# Patient Record
Sex: FEMALE | Race: WHITE | Employment: OTHER | URBAN - METROPOLITAN AREA
[De-identification: names, ages, dates, MRNs, and addresses within clinical notes are randomized per-mention and may not be internally consistent; named-entity substitution may affect disease eponyms.]

---

## 2021-09-08 ENCOUNTER — APPOINTMENT (OUTPATIENT)
Dept: CT IMAGING | Age: 61
DRG: 185 | End: 2021-09-08
Payer: COMMERCIAL

## 2021-09-08 ENCOUNTER — HOSPITAL ENCOUNTER (INPATIENT)
Age: 61
LOS: 1 days | Discharge: HOME | DRG: 185 | End: 2021-09-09
Attending: EMERGENCY MEDICINE | Admitting: SURGERY
Payer: COMMERCIAL

## 2021-09-08 ENCOUNTER — APPOINTMENT (OUTPATIENT)
Dept: GENERAL RADIOLOGY | Age: 61
DRG: 185 | End: 2021-09-08
Payer: COMMERCIAL
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
## 2021-09-08 DIAGNOSIS — S09.90XA CLOSED HEAD INJURY, INITIAL ENCOUNTER: ICD-10-CM

## 2021-09-08 DIAGNOSIS — S22.41XA CLOSED FRACTURE OF MULTIPLE RIBS OF RIGHT SIDE, INITIAL ENCOUNTER: ICD-10-CM

## 2021-09-08 PROBLEM — S22.42XA TRAUMATIC CLOSED DISPLACED FRACTURE OF MULTIPLE RIBS OF LEFT SIDE: Status: ACTIVE | Noted: 2021-09-08

## 2021-09-08 LAB
ABO/RH: NORMAL
ALLEN TEST: ABNORMAL
ANION GAP SERPL CALCULATED.3IONS-SCNC: 11 MMOL/L (ref 9–17)
ANTIBODY SCREEN: NEGATIVE
ARM BAND NUMBER: NORMAL
BLOOD BANK SPECIMEN: ABNORMAL
BUN BLDV-MCNC: 17 MG/DL (ref 8–23)
CARBOXYHEMOGLOBIN: 0.8 % (ref 0–5)
CHLORIDE BLD-SCNC: 102 MMOL/L (ref 98–107)
CO2: 24 MMOL/L (ref 20–31)
CREAT SERPL-MCNC: 0.7 MG/DL (ref 0.5–0.9)
ETHANOL PERCENT: <0.01 %
ETHANOL: <10 MG/DL
EXPIRATION DATE: NORMAL
FIO2: ABNORMAL
GFR AFRICAN AMERICAN: ABNORMAL ML/MIN
GFR NON-AFRICAN AMERICAN: ABNORMAL ML/MIN
GFR SERPL CREATININE-BSD FRML MDRD: ABNORMAL ML/MIN/{1.73_M2}
GFR SERPL CREATININE-BSD FRML MDRD: ABNORMAL ML/MIN/{1.73_M2}
GLUCOSE BLD-MCNC: 113 MG/DL (ref 70–99)
HCG QUALITATIVE: ABNORMAL
HCO3 VENOUS: 25.4 MMOL/L (ref 24–30)
HCT VFR BLD CALC: 43.1 % (ref 36.3–47.1)
HEMOGLOBIN: 14.2 G/DL (ref 11.9–15.1)
INR BLD: 0.9
MCH RBC QN AUTO: 27.5 PG (ref 25.2–33.5)
MCHC RBC AUTO-ENTMCNC: 32.9 G/DL (ref 28.4–34.8)
MCV RBC AUTO: 83.5 FL (ref 82.6–102.9)
METHEMOGLOBIN: ABNORMAL % (ref 0–1.5)
MODE: ABNORMAL
MYOGLOBIN: 526 NG/ML (ref 25–58)
NEGATIVE BASE EXCESS, VEN: ABNORMAL MMOL/L (ref 0–2)
NOTIFICATION TIME: ABNORMAL
NOTIFICATION: ABNORMAL
NRBC AUTOMATED: 0 PER 100 WBC
O2 DEVICE/FLOW/%: ABNORMAL
O2 SAT, VEN: 78.6 % (ref 60–85)
OXYHEMOGLOBIN: ABNORMAL % (ref 95–98)
PARTIAL THROMBOPLASTIN TIME: 22.8 SEC (ref 20.5–30.5)
PATIENT TEMP: 37
PCO2, VEN, TEMP ADJ: ABNORMAL MMHG (ref 39–55)
PCO2, VEN: 42.2 (ref 39–55)
PDW BLD-RTO: 14 % (ref 11.8–14.4)
PEEP/CPAP: ABNORMAL
PH VENOUS: 7.4 (ref 7.32–7.42)
PH, VEN, TEMP ADJ: ABNORMAL (ref 7.32–7.42)
PLATELET # BLD: 273 K/UL (ref 138–453)
PMV BLD AUTO: 8.8 FL (ref 8.1–13.5)
PO2, VEN, TEMP ADJ: ABNORMAL MMHG (ref 30–50)
PO2, VEN: 43 (ref 30–50)
POSITIVE BASE EXCESS, VEN: 0.9 MMOL/L (ref 0–2)
POTASSIUM SERPL-SCNC: 4.3 MMOL/L (ref 3.7–5.3)
PROTHROMBIN TIME: 10.2 SEC (ref 9.1–12.3)
PSV: ABNORMAL
PT. POSITION: ABNORMAL
RBC # BLD: 5.16 M/UL (ref 3.95–5.11)
RESPIRATORY RATE: ABNORMAL
SAMPLE SITE: ABNORMAL
SET RATE: ABNORMAL
SODIUM BLD-SCNC: 137 MMOL/L (ref 135–144)
TEXT FOR RESPIRATORY: ABNORMAL
TOTAL CK: 201 U/L (ref 26–192)
TOTAL HB: ABNORMAL G/DL (ref 12–16)
TOTAL RATE: ABNORMAL
TROPONIN INTERP: ABNORMAL
TROPONIN T: ABNORMAL NG/ML
TROPONIN, HIGH SENSITIVITY: <6 NG/L (ref 0–14)
VT: ABNORMAL
WBC # BLD: 10.1 K/UL (ref 3.5–11.3)

## 2021-09-08 PROCEDURE — 82947 ASSAY GLUCOSE BLOOD QUANT: CPT

## 2021-09-08 PROCEDURE — 6370000000 HC RX 637 (ALT 250 FOR IP): Performed by: STUDENT IN AN ORGANIZED HEALTH CARE EDUCATION/TRAINING PROGRAM

## 2021-09-08 PROCEDURE — 70450 CT HEAD/BRAIN W/O DYE: CPT

## 2021-09-08 PROCEDURE — 6810039001 HC L1 TRAUMA PRIORITY

## 2021-09-08 PROCEDURE — 83874 ASSAY OF MYOGLOBIN: CPT

## 2021-09-08 PROCEDURE — 2580000003 HC RX 258: Performed by: NURSE PRACTITIONER

## 2021-09-08 PROCEDURE — 73630 X-RAY EXAM OF FOOT: CPT

## 2021-09-08 PROCEDURE — 6370000000 HC RX 637 (ALT 250 FOR IP)

## 2021-09-08 PROCEDURE — 99283 EMERGENCY DEPT VISIT LOW MDM: CPT

## 2021-09-08 PROCEDURE — 86901 BLOOD TYPING SEROLOGIC RH(D): CPT

## 2021-09-08 PROCEDURE — 86850 RBC ANTIBODY SCREEN: CPT

## 2021-09-08 PROCEDURE — 73070 X-RAY EXAM OF ELBOW: CPT

## 2021-09-08 PROCEDURE — G0480 DRUG TEST DEF 1-7 CLASSES: HCPCS

## 2021-09-08 PROCEDURE — 71260 CT THORAX DX C+: CPT

## 2021-09-08 PROCEDURE — 1200000000 HC SEMI PRIVATE

## 2021-09-08 PROCEDURE — 73130 X-RAY EXAM OF HAND: CPT

## 2021-09-08 PROCEDURE — 72125 CT NECK SPINE W/O DYE: CPT

## 2021-09-08 PROCEDURE — 6360000002 HC RX W HCPCS: Performed by: NURSE PRACTITIONER

## 2021-09-08 PROCEDURE — 6370000000 HC RX 637 (ALT 250 FOR IP): Performed by: NURSE PRACTITIONER

## 2021-09-08 PROCEDURE — 3209999900 CT LUMBAR SPINE TRAUMA RECONSTRUCTION

## 2021-09-08 PROCEDURE — 80051 ELECTROLYTE PANEL: CPT

## 2021-09-08 PROCEDURE — 82550 ASSAY OF CK (CPK): CPT

## 2021-09-08 PROCEDURE — 87635 SARS-COV-2 COVID-19 AMP PRB: CPT

## 2021-09-08 PROCEDURE — 82565 ASSAY OF CREATININE: CPT

## 2021-09-08 PROCEDURE — 6360000004 HC RX CONTRAST MEDICATION: Performed by: EMERGENCY MEDICINE

## 2021-09-08 PROCEDURE — 73560 X-RAY EXAM OF KNEE 1 OR 2: CPT

## 2021-09-08 PROCEDURE — 82805 BLOOD GASES W/O2 SATURATION: CPT

## 2021-09-08 PROCEDURE — 84703 CHORIONIC GONADOTROPIN ASSAY: CPT

## 2021-09-08 PROCEDURE — 84484 ASSAY OF TROPONIN QUANT: CPT

## 2021-09-08 PROCEDURE — 3209999900 CT THORACIC SPINE TRAUMA RECONSTRUCTION

## 2021-09-08 PROCEDURE — 73590 X-RAY EXAM OF LOWER LEG: CPT

## 2021-09-08 PROCEDURE — 84520 ASSAY OF UREA NITROGEN: CPT

## 2021-09-08 PROCEDURE — 73610 X-RAY EXAM OF ANKLE: CPT

## 2021-09-08 PROCEDURE — 85027 COMPLETE CBC AUTOMATED: CPT

## 2021-09-08 PROCEDURE — 80307 DRUG TEST PRSMV CHEM ANLYZR: CPT

## 2021-09-08 PROCEDURE — 86900 BLOOD TYPING SEROLOGIC ABO: CPT

## 2021-09-08 PROCEDURE — 85730 THROMBOPLASTIN TIME PARTIAL: CPT

## 2021-09-08 PROCEDURE — 85610 PROTHROMBIN TIME: CPT

## 2021-09-08 RX ORDER — GABAPENTIN 300 MG/1
300 CAPSULE ORAL 3 TIMES DAILY
Status: DISCONTINUED | OUTPATIENT
Start: 2021-09-08 | End: 2021-09-09 | Stop reason: HOSPADM

## 2021-09-08 RX ORDER — ROSUVASTATIN CALCIUM 5 MG/1
5 TABLET, COATED ORAL DAILY
Status: DISCONTINUED | OUTPATIENT
Start: 2021-09-09 | End: 2021-09-09 | Stop reason: HOSPADM

## 2021-09-08 RX ORDER — BUSPIRONE HYDROCHLORIDE 5 MG/1
5 TABLET ORAL 3 TIMES DAILY
COMMUNITY
End: 2022-02-21 | Stop reason: SDUPTHER

## 2021-09-08 RX ORDER — PREDNISOLONE ACETATE 10 MG/ML
1 SUSPENSION/ DROPS OPHTHALMIC DAILY
Status: DISCONTINUED | OUTPATIENT
Start: 2021-09-09 | End: 2021-09-09 | Stop reason: HOSPADM

## 2021-09-08 RX ORDER — BRIMONIDINE TARTRATE 2 MG/ML
1 SOLUTION/ DROPS OPHTHALMIC 2 TIMES DAILY
Status: DISCONTINUED | OUTPATIENT
Start: 2021-09-08 | End: 2021-09-09 | Stop reason: HOSPADM

## 2021-09-08 RX ORDER — ROSUVASTATIN CALCIUM 5 MG/1
5 TABLET, COATED ORAL DAILY
Status: ON HOLD | COMMUNITY
End: 2022-10-20

## 2021-09-08 RX ORDER — ONDANSETRON 4 MG/1
4 TABLET, FILM COATED ORAL EVERY 8 HOURS PRN
Status: DISCONTINUED | OUTPATIENT
Start: 2021-09-08 | End: 2021-09-09 | Stop reason: HOSPADM

## 2021-09-08 RX ORDER — OXYCODONE HYDROCHLORIDE 5 MG/1
5 TABLET ORAL EVERY 6 HOURS PRN
Status: DISCONTINUED | OUTPATIENT
Start: 2021-09-08 | End: 2021-09-09 | Stop reason: HOSPADM

## 2021-09-08 RX ORDER — ACETAMINOPHEN 500 MG
1000 TABLET ORAL EVERY 8 HOURS SCHEDULED
Status: DISCONTINUED | OUTPATIENT
Start: 2021-09-08 | End: 2021-09-09 | Stop reason: HOSPADM

## 2021-09-08 RX ORDER — SENNA AND DOCUSATE SODIUM 50; 8.6 MG/1; MG/1
1 TABLET, FILM COATED ORAL 2 TIMES DAILY
Status: DISCONTINUED | OUTPATIENT
Start: 2021-09-08 | End: 2021-09-09 | Stop reason: HOSPADM

## 2021-09-08 RX ORDER — POLYETHYLENE GLYCOL 3350 17 G/17G
17 POWDER, FOR SOLUTION ORAL DAILY
Status: DISCONTINUED | OUTPATIENT
Start: 2021-09-08 | End: 2021-09-09 | Stop reason: HOSPADM

## 2021-09-08 RX ORDER — BUSPIRONE HYDROCHLORIDE 5 MG/1
5 TABLET ORAL 3 TIMES DAILY
Status: DISCONTINUED | OUTPATIENT
Start: 2021-09-08 | End: 2021-09-09 | Stop reason: HOSPADM

## 2021-09-08 RX ORDER — SODIUM CHLORIDE 0.9 % (FLUSH) 0.9 %
10 SYRINGE (ML) INJECTION EVERY 12 HOURS SCHEDULED
Status: DISCONTINUED | OUTPATIENT
Start: 2021-09-08 | End: 2021-09-09 | Stop reason: HOSPADM

## 2021-09-08 RX ORDER — GLIMEPIRIDE 2 MG/1
1 TABLET ORAL DAILY
Status: DISCONTINUED | OUTPATIENT
Start: 2021-09-09 | End: 2021-09-09 | Stop reason: HOSPADM

## 2021-09-08 RX ORDER — SOLIFENACIN SUCCINATE 5 MG/1
5 TABLET, FILM COATED ORAL DAILY
COMMUNITY
End: 2021-09-14

## 2021-09-08 RX ORDER — LATANOPROST 50 UG/ML
1 SOLUTION/ DROPS OPHTHALMIC NIGHTLY
Status: DISCONTINUED | OUTPATIENT
Start: 2021-09-08 | End: 2021-09-09 | Stop reason: HOSPADM

## 2021-09-08 RX ORDER — METHOCARBAMOL 750 MG/1
750 TABLET, FILM COATED ORAL 3 TIMES DAILY
Status: DISCONTINUED | OUTPATIENT
Start: 2021-09-08 | End: 2021-09-09 | Stop reason: HOSPADM

## 2021-09-08 RX ORDER — VIBEGRON 75 MG/1
75 TABLET, FILM COATED ORAL DAILY
COMMUNITY
End: 2022-02-24 | Stop reason: SDUPTHER

## 2021-09-08 RX ORDER — SODIUM CHLORIDE 0.9 % (FLUSH) 0.9 %
10 SYRINGE (ML) INJECTION PRN
Status: DISCONTINUED | OUTPATIENT
Start: 2021-09-08 | End: 2021-09-09 | Stop reason: HOSPADM

## 2021-09-08 RX ORDER — BACITRACIN, NEOMYCIN, POLYMYXIN B 400; 3.5; 5 [USP'U]/G; MG/G; [USP'U]/G
OINTMENT TOPICAL 2 TIMES DAILY
Status: DISCONTINUED | OUTPATIENT
Start: 2021-09-08 | End: 2021-09-09 | Stop reason: HOSPADM

## 2021-09-08 RX ORDER — ESOMEPRAZOLE MAGNESIUM 40 MG/1
40 FOR SUSPENSION ORAL 2 TIMES DAILY
COMMUNITY

## 2021-09-08 RX ADMIN — LATANOPROST 1 DROP: 50 SOLUTION OPHTHALMIC at 21:11

## 2021-09-08 RX ADMIN — BUSPIRONE HYDROCHLORIDE 5 MG: 5 TABLET ORAL at 22:27

## 2021-09-08 RX ADMIN — METHOCARBAMOL TABLETS 750 MG: 750 TABLET, COATED ORAL at 21:00

## 2021-09-08 RX ADMIN — DOCUSATE SODIUM 50MG AND SENNOSIDES 8.6MG 1 TABLET: 8.6; 5 TABLET, FILM COATED ORAL at 21:00

## 2021-09-08 RX ADMIN — ENOXAPARIN SODIUM 30 MG: 30 INJECTION SUBCUTANEOUS at 21:06

## 2021-09-08 RX ADMIN — GABAPENTIN 300 MG: 300 CAPSULE ORAL at 21:00

## 2021-09-08 RX ADMIN — BRIMONIDINE TARTRATE 1 DROP: 2 SOLUTION OPHTHALMIC at 21:12

## 2021-09-08 RX ADMIN — ONDANSETRON HYDROCHLORIDE 4 MG: 4 TABLET, FILM COATED ORAL at 20:48

## 2021-09-08 RX ADMIN — POLYMYXIN B SULFATE, BACITRACIN ZINC, NEOMYCIN SULFATE: 5000; 3.5; 4 OINTMENT TOPICAL at 21:14

## 2021-09-08 RX ADMIN — IOPAMIDOL 130 ML: 755 INJECTION, SOLUTION INTRAVENOUS at 15:47

## 2021-09-08 RX ADMIN — OXYCODONE HYDROCHLORIDE 5 MG: 5 TABLET ORAL at 18:51

## 2021-09-08 RX ADMIN — SODIUM CHLORIDE, PRESERVATIVE FREE 10 ML: 5 INJECTION INTRAVENOUS at 21:06

## 2021-09-08 ASSESSMENT — ENCOUNTER SYMPTOMS
ABDOMINAL DISTENTION: 0
NAUSEA: 0
PHOTOPHOBIA: 0
BACK PAIN: 1
SHORTNESS OF BREATH: 0
ABDOMINAL PAIN: 0
GASTROINTESTINAL NEGATIVE: 1
ALLERGIC/IMMUNOLOGIC NEGATIVE: 1
VOMITING: 0
EYES NEGATIVE: 1
RESPIRATORY NEGATIVE: 1

## 2021-09-08 ASSESSMENT — PAIN DESCRIPTION - PROGRESSION
CLINICAL_PROGRESSION: NOT CHANGED

## 2021-09-08 ASSESSMENT — PAIN DESCRIPTION - LOCATION: LOCATION: ANKLE;KNEE

## 2021-09-08 ASSESSMENT — PAIN DESCRIPTION - PAIN TYPE: TYPE: ACUTE PAIN;CHRONIC PAIN

## 2021-09-08 ASSESSMENT — PAIN - FUNCTIONAL ASSESSMENT: PAIN_FUNCTIONAL_ASSESSMENT: ACTIVITIES ARE NOT PREVENTED

## 2021-09-08 ASSESSMENT — PAIN SCALES - GENERAL
PAINLEVEL_OUTOF10: 5
PAINLEVEL_OUTOF10: 6
PAINLEVEL_OUTOF10: 6

## 2021-09-08 ASSESSMENT — PAIN DESCRIPTION - ORIENTATION: ORIENTATION: RIGHT

## 2021-09-08 ASSESSMENT — PAIN DESCRIPTION - FREQUENCY: FREQUENCY: CONTINUOUS

## 2021-09-08 ASSESSMENT — PAIN DESCRIPTION - DESCRIPTORS: DESCRIPTORS: ACHING;DISCOMFORT

## 2021-09-08 ASSESSMENT — PAIN DESCRIPTION - ONSET: ONSET: ON-GOING
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
## 2021-09-08 NOTE — PROGRESS NOTES
C- Spine Evaluation for Spine Clearance:    Pt is a 63 y.o. female who was admitted on 9/8/21 s/p [facility]. Pt w/ complaints of elbow, wrist, knee pain. C-Spine precautions of C-collar with spinal neutrality maintained since arrival with current exam directed at further evaluation of spine for clearance purposes. Pt chart and current images reviewed. CT C-Spine negative for acute fracture, subluxation, or traumatic injury. Patient does not have a distracting injury, is not acutely intoxicated and is alert, oriented and fully able to participate in exam.      Pt denies c-spine pain while resting in c-collar. C-collar removed w/ c-spine neutrality maintained. Pt denies midline pain with palpation of spinous processes and axial loading. Pt demonstrated full flexion, extension, and SB ROM without complaints of pain. C-spine is considered cleared w/out need for further imaging, evaluation, or continuation of c-collar.      Electronically signed by Raya Wheatley MD on 9/8/2021 at 4:41 PM

## 2021-09-09 ENCOUNTER — APPOINTMENT (OUTPATIENT)
Dept: GENERAL RADIOLOGY | Age: 61
DRG: 185 | End: 2021-09-09
Payer: COMMERCIAL

## 2021-09-09 VITALS
WEIGHT: 155 LBS | OXYGEN SATURATION: 96 % | TEMPERATURE: 98.1 F | BODY MASS INDEX: 27.46 KG/M2 | HEART RATE: 81 BPM | RESPIRATION RATE: 18 BRPM | SYSTOLIC BLOOD PRESSURE: 100 MMHG | HEIGHT: 63 IN | DIASTOLIC BLOOD PRESSURE: 64 MMHG

## 2021-09-09 LAB
ANION GAP SERPL CALCULATED.3IONS-SCNC: 10 MMOL/L (ref 9–17)
BUN BLDV-MCNC: 15 MG/DL (ref 8–23)
BUN/CREAT BLD: ABNORMAL (ref 9–20)
CALCIUM SERPL-MCNC: 8.6 MG/DL (ref 8.6–10.4)
CHLORIDE BLD-SCNC: 103 MMOL/L (ref 98–107)
CO2: 21 MMOL/L (ref 20–31)
CREAT SERPL-MCNC: 0.74 MG/DL (ref 0.5–0.9)
GFR AFRICAN AMERICAN: >60 ML/MIN
GFR NON-AFRICAN AMERICAN: >60 ML/MIN
GFR SERPL CREATININE-BSD FRML MDRD: ABNORMAL ML/MIN/{1.73_M2}
GFR SERPL CREATININE-BSD FRML MDRD: ABNORMAL ML/MIN/{1.73_M2}
GLUCOSE BLD-MCNC: 117 MG/DL (ref 70–99)
HCT VFR BLD CALC: 36.4 % (ref 36.3–47.1)
HEMOGLOBIN: 11.8 G/DL (ref 11.9–15.1)
MAGNESIUM: 2.1 MG/DL (ref 1.6–2.6)
MCH RBC QN AUTO: 27.4 PG (ref 25.2–33.5)
MCHC RBC AUTO-ENTMCNC: 32.4 G/DL (ref 28.4–34.8)
MCV RBC AUTO: 84.7 FL (ref 82.6–102.9)
NRBC AUTOMATED: 0 PER 100 WBC
PDW BLD-RTO: 14 % (ref 11.8–14.4)
PHOSPHORUS: 3.8 MG/DL (ref 2.6–4.5)
PLATELET # BLD: 226 K/UL (ref 138–453)
PMV BLD AUTO: 9.2 FL (ref 8.1–13.5)
POTASSIUM SERPL-SCNC: 3.9 MMOL/L (ref 3.7–5.3)
RBC # BLD: 4.3 M/UL (ref 3.95–5.11)
SODIUM BLD-SCNC: 134 MMOL/L (ref 135–144)
WBC # BLD: 6.4 K/UL (ref 3.5–11.3)

## 2021-09-09 PROCEDURE — 97166 OT EVAL MOD COMPLEX 45 MIN: CPT

## 2021-09-09 PROCEDURE — 85027 COMPLETE CBC AUTOMATED: CPT

## 2021-09-09 PROCEDURE — 83735 ASSAY OF MAGNESIUM: CPT

## 2021-09-09 PROCEDURE — 84100 ASSAY OF PHOSPHORUS: CPT

## 2021-09-09 PROCEDURE — 71046 X-RAY EXAM CHEST 2 VIEWS: CPT

## 2021-09-09 PROCEDURE — 6360000002 HC RX W HCPCS: Performed by: NURSE PRACTITIONER

## 2021-09-09 PROCEDURE — 80048 BASIC METABOLIC PNL TOTAL CA: CPT

## 2021-09-09 PROCEDURE — 97162 PT EVAL MOD COMPLEX 30 MIN: CPT

## 2021-09-09 PROCEDURE — 6370000000 HC RX 637 (ALT 250 FOR IP): Performed by: NURSE PRACTITIONER

## 2021-09-09 PROCEDURE — 97535 SELF CARE MNGMENT TRAINING: CPT

## 2021-09-09 PROCEDURE — 2580000003 HC RX 258: Performed by: NURSE PRACTITIONER

## 2021-09-09 PROCEDURE — 6370000000 HC RX 637 (ALT 250 FOR IP)

## 2021-09-09 PROCEDURE — 36415 COLL VENOUS BLD VENIPUNCTURE: CPT

## 2021-09-09 PROCEDURE — 6370000000 HC RX 637 (ALT 250 FOR IP): Performed by: STUDENT IN AN ORGANIZED HEALTH CARE EDUCATION/TRAINING PROGRAM

## 2021-09-09 PROCEDURE — 97116 GAIT TRAINING THERAPY: CPT

## 2021-09-09 RX ORDER — POLYETHYLENE GLYCOL 3350 17 G/17G
17 POWDER, FOR SOLUTION ORAL DAILY
Qty: 7 EACH | Refills: 0 | Status: SHIPPED | OUTPATIENT
Start: 2021-09-10 | End: 2021-09-17

## 2021-09-09 RX ORDER — ONDANSETRON 4 MG/1
4 TABLET, FILM COATED ORAL 3 TIMES DAILY PRN
Qty: 15 TABLET | Refills: 0 | Status: SHIPPED | OUTPATIENT
Start: 2021-09-09 | End: 2021-09-14

## 2021-09-09 RX ORDER — BRIMONIDINE TARTRATE, TIMOLOL MALEATE 2; 5 MG/ML; MG/ML
1 SOLUTION/ DROPS OPHTHALMIC EVERY 12 HOURS
COMMUNITY

## 2021-09-09 RX ORDER — GABAPENTIN 300 MG/1
300 CAPSULE ORAL 3 TIMES DAILY
Qty: 21 CAPSULE | Refills: 0 | Status: SHIPPED | OUTPATIENT
Start: 2021-09-09 | End: 2022-06-03

## 2021-09-09 RX ORDER — OXYCODONE HYDROCHLORIDE 5 MG/1
5 TABLET ORAL EVERY 8 HOURS PRN
Qty: 9 TABLET | Refills: 0 | Status: SHIPPED | OUTPATIENT
Start: 2021-09-09 | End: 2021-09-12

## 2021-09-09 RX ORDER — LATANOPROST 50 UG/ML
1 SOLUTION/ DROPS OPHTHALMIC NIGHTLY
COMMUNITY

## 2021-09-09 RX ORDER — TIMOLOL MALEATE 2.5 MG/ML
1 SOLUTION OPHTHALMIC DAILY
COMMUNITY

## 2021-09-09 RX ORDER — METHOCARBAMOL 750 MG/1
750 TABLET, FILM COATED ORAL 3 TIMES DAILY
Qty: 21 TABLET | Refills: 0 | Status: SHIPPED | OUTPATIENT
Start: 2021-09-09 | End: 2021-09-16

## 2021-09-09 RX ORDER — PREDNISOLONE SODIUM PHOSPHATE 10 MG/ML
1 SOLUTION/ DROPS OPHTHALMIC DAILY
COMMUNITY

## 2021-09-09 RX ADMIN — DOCUSATE SODIUM 50MG AND SENNOSIDES 8.6MG 1 TABLET: 8.6; 5 TABLET, FILM COATED ORAL at 08:30

## 2021-09-09 RX ADMIN — ONDANSETRON HYDROCHLORIDE 4 MG: 4 TABLET, FILM COATED ORAL at 14:18

## 2021-09-09 RX ADMIN — METHOCARBAMOL TABLETS 750 MG: 750 TABLET, COATED ORAL at 08:30

## 2021-09-09 RX ADMIN — POLYMYXIN B SULFATE, BACITRACIN ZINC, NEOMYCIN SULFATE: 5000; 3.5; 4 OINTMENT TOPICAL at 08:35

## 2021-09-09 RX ADMIN — ENOXAPARIN SODIUM 30 MG: 30 INJECTION SUBCUTANEOUS at 08:33

## 2021-09-09 RX ADMIN — ESOMEPRAZOLE MAGNESIUM 40 MG: 20 CAPSULE, DELAYED RELEASE ORAL at 08:28

## 2021-09-09 RX ADMIN — GABAPENTIN 300 MG: 300 CAPSULE ORAL at 14:13

## 2021-09-09 RX ADMIN — OXYCODONE HYDROCHLORIDE 5 MG: 5 TABLET ORAL at 05:15

## 2021-09-09 RX ADMIN — METHOCARBAMOL TABLETS 750 MG: 750 TABLET, COATED ORAL at 14:13

## 2021-09-09 RX ADMIN — BRIMONIDINE TARTRATE 1 DROP: 2 SOLUTION OPHTHALMIC at 08:48

## 2021-09-09 RX ADMIN — BUSPIRONE HYDROCHLORIDE 5 MG: 5 TABLET ORAL at 14:13

## 2021-09-09 RX ADMIN — OXYCODONE HYDROCHLORIDE 5 MG: 5 TABLET ORAL at 14:15

## 2021-09-09 RX ADMIN — PREDNISOLONE ACETATE 1 DROP: 10 SUSPENSION/ DROPS OPHTHALMIC at 08:28

## 2021-09-09 RX ADMIN — TIMOLOL MALEATE 1 DROP: 2.5 SOLUTION/ DROPS OPHTHALMIC at 08:27

## 2021-09-09 RX ADMIN — GABAPENTIN 300 MG: 300 CAPSULE ORAL at 08:29

## 2021-09-09 RX ADMIN — ONDANSETRON HYDROCHLORIDE 4 MG: 4 TABLET, FILM COATED ORAL at 05:15

## 2021-09-09 RX ADMIN — BUSPIRONE HYDROCHLORIDE 5 MG: 5 TABLET ORAL at 08:29

## 2021-09-09 RX ADMIN — SODIUM CHLORIDE, PRESERVATIVE FREE 10 ML: 5 INJECTION INTRAVENOUS at 08:45

## 2021-09-09 RX ADMIN — POLYETHYLENE GLYCOL 3350 17 G: 17 POWDER, FOR SOLUTION ORAL at 08:27

## 2021-09-09 ASSESSMENT — PAIN DESCRIPTION - ORIENTATION
ORIENTATION: RIGHT

## 2021-09-09 ASSESSMENT — PAIN DESCRIPTION - PROGRESSION

## 2021-09-09 ASSESSMENT — PAIN SCALES - GENERAL
PAINLEVEL_OUTOF10: 6
PAINLEVEL_OUTOF10: 3
PAINLEVEL_OUTOF10: 7
PAINLEVEL_OUTOF10: 3
PAINLEVEL_OUTOF10: 7
PAINLEVEL_OUTOF10: 5

## 2021-09-09 ASSESSMENT — PAIN DESCRIPTION - DESCRIPTORS
DESCRIPTORS: ACHING;DISCOMFORT
DESCRIPTORS: ACHING;DISCOMFORT;TENDER;SORE
DESCRIPTORS: ACHING;DISCOMFORT

## 2021-09-09 ASSESSMENT — PAIN DESCRIPTION - LOCATION
LOCATION: RIB CAGE

## 2021-09-09 ASSESSMENT — PAIN DESCRIPTION - FREQUENCY
FREQUENCY: CONTINUOUS
FREQUENCY: CONTINUOUS

## 2021-09-09 ASSESSMENT — PAIN DESCRIPTION - PAIN TYPE
TYPE: ACUTE PAIN

## 2021-09-09 ASSESSMENT — PAIN - FUNCTIONAL ASSESSMENT: PAIN_FUNCTIONAL_ASSESSMENT: PREVENTS OR INTERFERES SOME ACTIVE ACTIVITIES AND ADLS

## 2021-09-09 NOTE — CARE COORDINATION
Case Management Initial Discharge Plan  Anvik Nephew,             Met with:patient to discuss discharge plans. Information verified: address, contacts, phone number, , insurance Yes  Insurance Provider: ra[employer]    Emergency Contact/Next of Kin name & number:  david  Who are involved in patient's support system? Family     PCP: Aamir Cobb MD  Date of last visit: march      Discharge Planning    Living Arrangements:    lives with      Home has 2  stories  Location of bedroom/bathroom in home 1st    Patient able to perform ADL's:Independent    Current Services (outpatient & in home) none  DME equipment: walker, cpap, shower chair, raised toilet seat      Is patient receiving oral anticoagulation therapy? No    Potential Assistance Needed:   f/u pcp       Evaluation: no    Expected Discharge date:       Patient expects to be discharged to:   home    If home: is the family and/or caregiver wiling & able to provide support at home? yes  Who will be providing this support?       Transportation provider:   Transportation arrangements needed for discharge: No    Readmission Risk              Risk of Unplanned Readmission:  10             Does patient have a readmission risk score greater than 14?: No  If yes, follow-up appointment must be made within 7 days of discharge.      Goals of Care: safe transition plan       Educated yes on transitional options, provided freedom of choice and are agreeable with plan      Discharge Plan:  Return home with            Electronically signed by Armin Almaraz RN on 21 at 11:24 AM EDT
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
## 2021-09-09 NOTE — CARE COORDINATION
Met with pt to complete an SBIRT. Pt is alert and oriented. She was involved in a [facility]. She denies alcohol/drug use. She also denied depression. Alcohol Screening and Brief Intervention        Recent Labs     09/08/21  1535   ALC <10       Alcohol Pre-screening     (WOMEN ONLY) How many times in the past year have you had 4 or more drinks in a day?: None    Alcohol Screening Audit       Drug Pre-Screening   How many times in the past year have you used a recreational drug or used a prescription medication for nonmedical reasons?: None    Drug Screening DAST       Mood Pre-Screening (PHQ-2)  During the past two weeks, have you been bothered by little interest or pleasure in doing things?: No  During the past two weeks, have you been bothered by feeling down, depressed, or hopeless?: No    Mood Pre-Screening (PHQ-9)         I have interviewed Miki Lunsford, [id number] regarding  Her alcohol consumption/drug use and risk for excessive use. Screenings were negative. Patient  N/A intervention at this time.    Deferred []    Completed on: 9/9/2021   PROSPER Naranjo

## 2021-09-09 NOTE — PLAN OF CARE
Problem: Pain:  Goal: Pain level will decrease  Description: Pain level will decrease  9/9/2021 0347 by Samantha Velasquez RN  Outcome: Ongoing  Goal: Control of acute pain  Description: Control of acute pain  9/9/2021 0347 by Samantha Velasquez RN  Outcome: Ongoing  Goal: Control of chronic pain  Description: Control of chronic pain  9/9/2021 0347 by Samantha Velasquez RN  Outcome: Ongoing
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
## 2021-09-10 ENCOUNTER — CARE COORDINATION (OUTPATIENT)
Dept: CASE MANAGEMENT | Age: 61
End: 2021-09-10
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
## 2021-09-13 ENCOUNTER — CARE COORDINATION (OUTPATIENT)
Dept: CASE MANAGEMENT | Age: 61
End: 2021-09-13
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
## 2021-09-14 ENCOUNTER — OFFICE VISIT (OUTPATIENT)
Dept: SURGERY | Age: 61
End: 2021-09-14
Payer: COMMERCIAL

## 2021-09-14 ENCOUNTER — TELEPHONE (OUTPATIENT)
Dept: SURGERY | Age: 61
End: 2021-09-14

## 2021-09-14 VITALS
WEIGHT: 155 LBS | HEART RATE: 68 BPM | BODY MASS INDEX: 27.46 KG/M2 | HEIGHT: 63 IN | SYSTOLIC BLOOD PRESSURE: 121 MMHG | DIASTOLIC BLOOD PRESSURE: 77 MMHG

## 2021-09-14 DIAGNOSIS — S22.41XD CLOSED FRACTURE OF MULTIPLE RIBS OF RIGHT SIDE WITH ROUTINE HEALING, SUBSEQUENT ENCOUNTER: Primary | ICD-10-CM

## 2021-09-14 PROBLEM — S22.42XA TRAUMATIC CLOSED DISPLACED FRACTURE OF MULTIPLE RIBS OF LEFT SIDE: Status: RESOLVED | Noted: 2021-09-08 | Resolved: 2021-09-14

## 2021-09-14 PROCEDURE — 1036F TOBACCO NON-USER: CPT | Performed by: SPECIALIST

## 2021-09-14 PROCEDURE — G8427 DOCREV CUR MEDS BY ELIG CLIN: HCPCS | Performed by: SPECIALIST

## 2021-09-14 PROCEDURE — 1111F DSCHRG MED/CURRENT MED MERGE: CPT | Performed by: SPECIALIST

## 2021-09-14 PROCEDURE — 3017F COLORECTAL CA SCREEN DOC REV: CPT | Performed by: SPECIALIST

## 2021-09-14 PROCEDURE — 99213 OFFICE O/P EST LOW 20 MIN: CPT | Performed by: SPECIALIST

## 2021-09-14 PROCEDURE — G8419 CALC BMI OUT NRM PARAM NOF/U: HCPCS | Performed by: SPECIALIST

## 2021-09-14 RX ORDER — IBUPROFEN 800 MG/1
400 TABLET ORAL 2 TIMES DAILY PRN
Qty: 28 TABLET | Refills: 1 | Status: SHIPPED | OUTPATIENT
Start: 2021-09-14 | End: 2022-10-20

## 2021-09-14 RX ORDER — METHOCARBAMOL 500 MG/1
750 TABLET, FILM COATED ORAL 3 TIMES DAILY
Qty: 21 TABLET | Refills: 0 | Status: SHIPPED | OUTPATIENT
Start: 2021-09-14 | End: 2021-09-23 | Stop reason: SDUPTHER

## 2021-09-14 RX ORDER — GABAPENTIN 300 MG/1
300 CAPSULE ORAL 3 TIMES DAILY
Qty: 21 CAPSULE | Refills: 1 | Status: SHIPPED | OUTPATIENT
Start: 2021-09-14 | End: 2021-09-23 | Stop reason: SDUPTHER

## 2021-09-14 NOTE — TELEPHONE ENCOUNTER
Vicki from [facility] called requesting clarification on quantity as it doesn't add up. RMA returned call and stated the quantity is 31.5 tablets and it's okay to round up to 32. Navin Pizano stated her understanding.
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
## 2021-09-23 ENCOUNTER — HOSPITAL ENCOUNTER (OUTPATIENT)
Dept: GENERAL RADIOLOGY | Facility: CLINIC | Age: 61
Discharge: HOME | End: 2021-09-25
Payer: COMMERCIAL

## 2021-09-23 DIAGNOSIS — M25.551 RIGHT HIP PAIN: ICD-10-CM
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
## 2021-09-23 PROCEDURE — 73521 X-RAY EXAM HIPS BI 2 VIEWS: CPT

## 2021-09-24 RX ORDER — GABAPENTIN 300 MG/1
CAPSULE ORAL
Qty: 21 CAPSULE | Refills: 1 | OUTPATIENT
Start: 2021-09-24

## 2021-10-18 ENCOUNTER — HOSPITAL ENCOUNTER (OUTPATIENT)
Dept: MAMMOGRAPHY | Facility: CLINIC | Age: 61
Discharge: HOME | End: 2021-10-20
Payer: COMMERCIAL

## 2021-10-18 DIAGNOSIS — Z12.31 VISIT FOR SCREENING MAMMOGRAM: ICD-10-CM

## 2021-10-18 PROCEDURE — 77067 SCR MAMMO BI INCL CAD: CPT

## 2021-11-04 DIAGNOSIS — M79.672 LEFT FOOT PAIN: Primary | ICD-10-CM

## 2021-11-05 ENCOUNTER — OFFICE VISIT (OUTPATIENT)
Dept: ORTHOPEDIC SURGERY | Age: 61
End: 2021-11-05
Payer: COMMERCIAL

## 2021-11-05 DIAGNOSIS — M25.561 CHRONIC PAIN OF BOTH KNEES: Primary | ICD-10-CM

## 2021-11-05 DIAGNOSIS — G89.29 CHRONIC PAIN OF BOTH KNEES: Primary | ICD-10-CM

## 2021-11-05 DIAGNOSIS — M25.562 CHRONIC PAIN OF BOTH KNEES: Primary | ICD-10-CM

## 2021-11-05 PROCEDURE — G8484 FLU IMMUNIZE NO ADMIN: HCPCS | Performed by: ORTHOPAEDIC SURGERY

## 2021-11-05 PROCEDURE — G8419 CALC BMI OUT NRM PARAM NOF/U: HCPCS | Performed by: ORTHOPAEDIC SURGERY

## 2021-11-05 PROCEDURE — 99203 OFFICE O/P NEW LOW 30 MIN: CPT | Performed by: ORTHOPAEDIC SURGERY

## 2021-11-05 PROCEDURE — 3017F COLORECTAL CA SCREEN DOC REV: CPT | Performed by: ORTHOPAEDIC SURGERY

## 2021-11-05 PROCEDURE — 1036F TOBACCO NON-USER: CPT | Performed by: ORTHOPAEDIC SURGERY

## 2021-11-05 PROCEDURE — G8428 CUR MEDS NOT DOCUMENT: HCPCS | Performed by: ORTHOPAEDIC SURGERY

## 2021-11-05 NOTE — PROGRESS NOTES
Lilly Salazar M.D.            [address] [facility],[address], [facility].           Dept Phone: [phone]           Dept Fax:  [phone]           [place]          Dept Phone: [phone]           Dept Fax:  [phone]      Chief Compliant:  Chief Complaint   Patient presents with    Pain     bilat knees        History of Present Illness: This is a 63 y.o. female who presents to the clinic today for evaluation / follow up of bilateral knee pain. Patient is a 71-year-old female who has a history of rheumatoid arthritis. She was diagnosed at the age of 10. She had a previous arthroscopy on her right knee back in late 2020. She presently is on Simponi as well as methotrexate. She is not able to take anti-inflammatories due to GERD patient has had multiple steroid injections in the past these only were effective for several weeks for her. She has been through physical therapy on numerous occasions as well. She describes pain a sitting descending stairs getting out of chairs etc.  Occasionally she does have flareups but she is doing good today. .       Review of Systems   Constitutional: Negative for fever, chills, sweats. Eyes: Negative for changes in vision, or pain. HENT: Negative for ear ache, epistaxis, or sore throat. Respiratory/Cardio: Negative for Chest pain, palpitations, SOB, or cough. Gastrointestinal: Negative for abdominal pain, N/V/D. Genitourinary: Negative for dysuria, frequency, urgency, or hematuria. Neurological: Negative for headache, numbness, or weakness. Integumentary: Negative for rash, itching, laceration, or abrasion. Musculoskeletal: Positive for Pain (bilat knees)       Physical Exam:  Constitutional: Patient is oriented to person, place, and time.  Patient appears well-developed and well nourished. HENT: Negative otherwise noted  Head: Normocephalic and Atraumatic  Nose: Normal  Eyes: Conjunctivae and EOM are normal  Neck: Normal range of motion Neck supple. Respiratory/Cardio: Effort normal. No respiratory distress. Musculoskeletal: Examination of the patient's right knee notes of previous portals. She has no swelling today. Her motion is actually surprisingly good 0 130 degrees Goyo's negative ligamentously she is grossly intact. Examination of her left knee notes that she also has no effusion today motion 0 to 130 degrees she has good stability good tracking throughout no findings on Goyo's ligamentously grossly intact. Neurological: Patient is alert and oriented to person, place, and time. Normal strenght. No sensory deficit. Skin: Skin is warm and dry  Psychiatric: Behavior is normal. Thought content normal.  Nursing note and vitals reviewed. Labs and Imaging:   Patient did have x-rays taken in September of this year AP lateral views of both knees standing notes that she has very modest medial lateral joint space narrowing but nothing too severe especially for longstanding rheumatoid arthritis. Lateral view show some mild patellofemoral narrowing as well. Patient was noted that time to have a fairly decent effusion going on to both knees but is certainly better today. No orders of the defined types were placed in this encounter. Assessment and Plan:  1. Chronic pain of both knees    2. Rheumatoid arthritis both knees as well as systemically        This is a 63 y.o. female who presents to the clinic today for evaluation / follow up of bilateral knee pain.      Past History:    Current Outpatient Medications:     rosuvastatin (CRESTOR) 5 MG tablet, Take 1 tablet by mouth nightly, Disp: 90 tablet, Rfl: 0    gabapentin (NEURONTIN) 300 MG capsule, TAKE ONE CAPSULE BY MOUTH THREE TIMES A DAY FOR 7 DAYS, Disp: 21 capsule, Rfl: 1    ibuprofen (ADVIL;MOTRIN) 800 MG tablet, Take 0.5 tablets by mouth 2 times daily as needed for Pain, Disp: 28 tablet, Rfl: 1    latanoprost (XALATAN) 0.005 % ophthalmic solution, Place 1 drop into the left eye nightly, Disp: , Rfl:     brimonidine-timolol (COMBIGAN) 0.2-0.5 % ophthalmic solution, Place 1 drop into the left eye every 12 hours, Disp: , Rfl:     timolol (TIMOPTIC-XE) 0.25 % ophthalmic gel-forming, Place 1 drop into the left eye daily, Disp: , Rfl:     prednisoLONE sodium phosphate (INFLAMASE FORTE) 1 % ophthalmic solution, Place 1 drop into the right eye Daily, Disp: , Rfl:     gabapentin (NEURONTIN) 300 MG capsule, Take 1 capsule by mouth 3 times daily for 7 days. , Disp: 21 capsule, Rfl: 0    Vibegron (GEMTESA) 75 MG TABS, Take 75 mg by mouth daily, Disp: , Rfl:     esomeprazole Magnesium (NEXIUM) 40 MG PACK, Take 40 mg by mouth 2 times daily, Disp: , Rfl:     busPIRone (BUSPAR) 5 MG tablet, Take 5 mg by mouth 3 times daily, Disp: , Rfl:     metoprolol tartrate (LOPRESSOR) 25 MG tablet, Take 25 mg by mouth 2 times daily, Disp: , Rfl:     rosuvastatin (CRESTOR) 5 MG tablet, Take 5 mg by mouth daily, Disp: , Rfl:     calcium carbonate-vitamin D3 (CALTRATE) 600-400 MG-UNIT TABS per tab, Take 600 mg by mouth 2 times daily, Disp: , Rfl:     Biotin 85824 MCG TABS, Take 1 tablet by mouth daily, Disp: , Rfl:     Wheat Dextrin-Calcium (BENEFIBER PLUS CALCIUM) POWD, Take 1 Dose by mouth daily, Disp: , Rfl:     busPIRone (BUSPAR) 5 MG tablet, TAKE 1 TABLET BY MOUTH THREE TIMES DAILY GENERIC EQUIVALENT FOR BUSPAR, Disp: , Rfl:     diclofenac sodium (VOLTAREN) 1 % GEL, Apply topically as needed, Disp: , Rfl:     neomycin-polymyxin-hydrocortisone 1 % SOLN otic solution, Place 3 drops in ear(s) 3 times daily, Disp: , Rfl:     tiZANidine (ZANAFLEX) 4 MG tablet, Take 4 mg by mouth every 8 hours as needed, Disp: , Rfl:     brimonidine (ALPHAGAN) 0.2 % ophthalmic solution, Place 1 drop into the left eye 2 times daily, Disp: , Rfl:     esomeprazole (NEXIUM) 40 MG delayed release capsule, Take 40 mg by mouth 2 times daily, Disp: , Rfl:     SIMPONI 50 MG/0.5ML SOAJ, Inject 50 mg into the muscle every 30 days, Disp: , Rfl:     latanoprost (XALATAN) 0.005 % ophthalmic solution, Place 1 drop into the left eye every evening, Disp: , Rfl:     leucovorin calcium (WELLCOVORIN) 5 MG tablet, 5 mg once a week, Disp: , Rfl:     methotrexate Sodium (RHEUMATREX) 50 MG/2ML chemo injection, 0.88 mg once a week, Disp: , Rfl:     metoprolol tartrate (LOPRESSOR) 25 MG tablet, Take 25 mg by mouth 2 times daily, Disp: , Rfl:     prednisoLONE acetate (PRED FORTE) 1 % ophthalmic suspension, Place 1 drop into the right eye every evening, Disp: , Rfl:     solifenacin (VESICARE) 5 MG tablet, Take 5 mg by mouth daily, Disp: , Rfl:     timolol (TIMOPTIC) 0.5 % ophthalmic solution, Place 1 drop into the left eye every morning, Disp: , Rfl:   Allergies   Allergen Reactions    Ambien [Zolpidem]     Ativan [Lorazepam]     Sulfa Antibiotics     Sulfa Antibiotics Rash     Social History     Socioeconomic History    Marital status:      Spouse name: Not on file    Number of children: Not on file    Years of education: Not on file    Highest education level: Not on file   Occupational History    Not on file   Tobacco Use    Smoking status: Never Smoker    Smokeless tobacco: Never Used   Substance and Sexual Activity    Alcohol use: Yes     Comment: socially    Drug use: Not on file    Sexual activity: Not on file   Other Topics Concern    Not on file   Social History Narrative    ** Merged History Encounter **          Social Determinants of Health     Financial Resource Strain:     Difficulty of Paying Living Expenses:    Food Insecurity: No Food Insecurity    Worried About Running Out of Food in the Last Year: Never true    Deuce of Food in the Last Year: Never true   Transportation Needs: No Transportation Needs    Lack of Transportation (Medical): No    Lack of Transportation (Non-Medical): No   Physical Activity:     Days of Exercise per Week:     Minutes of Exercise per Session:    Stress:     Feeling of Stress :    Social Connections:     Frequency of Communication with Friends and Family:     Frequency of Social Gatherings with Friends and Family:     Attends [employer] Services:     Active Member of Clubs or Organizations:     Attends Club or Organization Meetings:     Marital Status:    Intimate Partner Violence:     Fear of Current or Ex-Partner:     Emotionally Abused:     Physically Abused:     Sexually Abused:      Past Medical History:   Diagnosis Date    ADHD     Anxiety     Colon polyps     Depression     Diverticulitis     Gastroparesis     Glaucoma     Hiatal hernia     Hyperlipidemia     Hypertension     NUZHAT (obstructive sleep apnea)     Osteoporosis     RA (rheumatoid arthritis) ([facility] [address]      Past Surgical History:   Procedure Laterality Date    CATARACT REMOVAL Right     FOOT FUSION Left     FOOT SURGERY Right     GLAUCOMA SURGERY      HAND SURGERY Right     JOINT REPLACEMENT Bilateral     TUBAL LIGATION       Family History   Problem Relation Age of Onset    Arthritis Mother     Uterine Cancer Mother     Atrial Fibrillation Mother     COPD Father     Diabetes Father     Hypertension Father     Prostate Cancer Father    Plan  Patient feel would be an excellent candidate for viscosupplementation. She has failed steroid injections in the past.  She is also taking Biologics as well as methotrexate. She is undergone physical therapy and still having knee pain. She certainly does not appear to based radiographically at this time to require total knee arthroplasties. Therefore I feel the patient is excellent candidate for viscosupplementation and we require prior authorization for this.       Provider Attestation:  Yumiko Kaba, personally performed the services described in this documentation. All medical record entries made by the scribe were at my direction and in my presence. I have reviewed the chart and discharge instructions and agree that the records reflect my personal performance and is accurate and complete. Rylie Barajas MD. 11/05/21      Please note that this chart was generated using voice recognition Dragon dictation software. Although every effort was made to ensure the accuracy of this automated transcription, some errors in transcription may have occurred.

## 2021-11-08 ENCOUNTER — OFFICE VISIT (OUTPATIENT)
Dept: ORTHOPEDIC SURGERY | Age: 61
End: 2021-11-08
Payer: COMMERCIAL

## 2021-11-08 VITALS — HEART RATE: 71 BPM | DIASTOLIC BLOOD PRESSURE: 89 MMHG | SYSTOLIC BLOOD PRESSURE: 154 MMHG

## 2021-11-08 DIAGNOSIS — M19.171 POST-TRAUMATIC OSTEOARTHRITIS OF RIGHT ANKLE: Primary | ICD-10-CM

## 2021-11-08 DIAGNOSIS — M76.71 PERONEAL TENDINITIS, RIGHT: ICD-10-CM

## 2021-11-08 DIAGNOSIS — M76.821 POSTERIOR TIBIAL TENDON DYSFUNCTION (PTTD) OF RIGHT LOWER EXTREMITY: ICD-10-CM

## 2021-11-08 DIAGNOSIS — M19.071 OSTEOARTHRITIS OF RIGHT SUBTALAR JOINT: ICD-10-CM

## 2021-11-08 PROCEDURE — G8427 DOCREV CUR MEDS BY ELIG CLIN: HCPCS | Performed by: FAMILY MEDICINE

## 2021-11-08 PROCEDURE — G8419 CALC BMI OUT NRM PARAM NOF/U: HCPCS | Performed by: FAMILY MEDICINE

## 2021-11-08 PROCEDURE — 3017F COLORECTAL CA SCREEN DOC REV: CPT | Performed by: FAMILY MEDICINE

## 2021-11-08 PROCEDURE — G8484 FLU IMMUNIZE NO ADMIN: HCPCS | Performed by: FAMILY MEDICINE

## 2021-11-08 PROCEDURE — 20611 DRAIN/INJ JOINT/BURSA W/US: CPT | Performed by: FAMILY MEDICINE

## 2021-11-08 PROCEDURE — 99204 OFFICE O/P NEW MOD 45 MIN: CPT | Performed by: FAMILY MEDICINE

## 2021-11-08 PROCEDURE — 1036F TOBACCO NON-USER: CPT | Performed by: FAMILY MEDICINE

## 2021-11-08 RX ORDER — BUPIVACAINE HYDROCHLORIDE 5 MG/ML
0.5 INJECTION, SOLUTION PERINEURAL ONCE
Status: COMPLETED | OUTPATIENT
Start: 2021-11-08 | End: 2021-11-08

## 2021-11-08 RX ORDER — TRIAMCINOLONE ACETONIDE 40 MG/ML
40 INJECTION, SUSPENSION INTRA-ARTICULAR; INTRAMUSCULAR ONCE
Status: COMPLETED | OUTPATIENT
Start: 2021-11-08 | End: 2021-11-08

## 2021-11-08 RX ADMIN — TRIAMCINOLONE ACETONIDE 40 MG: 40 INJECTION, SUSPENSION INTRA-ARTICULAR; INTRAMUSCULAR at 16:38

## 2021-11-08 RX ADMIN — BUPIVACAINE HYDROCHLORIDE 2.5 MG: 5 INJECTION, SOLUTION PERINEURAL at 16:37
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
## 2021-11-11 ENCOUNTER — TELEPHONE (OUTPATIENT)
Dept: ORTHOPEDIC SURGERY | Age: 61
End: 2021-11-11

## 2021-11-11 NOTE — TELEPHONE ENCOUNTER
Called patient to inform of the [employer] approval bilateral knee.        Ref # [id number]    No answer left vm to call back and make follow up

## 2021-11-17 ENCOUNTER — TELEPHONE (OUTPATIENT)
Dept: ORTHOPEDIC SURGERY | Age: 61
End: 2021-11-17

## 2021-11-17 NOTE — TELEPHONE ENCOUNTER
Vani Traylor from [facility] called in regards to the Ladonna Shin for knee injections Robert. Vani Traylor is needing to speak with clinical please advise and address thank you!

## 2021-11-30 DIAGNOSIS — M25.562 CHRONIC PAIN OF BOTH KNEES: Primary | ICD-10-CM

## 2021-11-30 DIAGNOSIS — M17.12 ARTHRITIS OF LEFT KNEE: ICD-10-CM

## 2021-11-30 DIAGNOSIS — M25.561 CHRONIC PAIN OF BOTH KNEES: Primary | ICD-10-CM

## 2021-11-30 DIAGNOSIS — G89.29 CHRONIC PAIN OF BOTH KNEES: Primary | ICD-10-CM

## 2021-11-30 DIAGNOSIS — M17.11 ARTHRITIS OF RIGHT KNEE: ICD-10-CM

## 2021-12-01 ENCOUNTER — TELEPHONE (OUTPATIENT)
Dept: ORTHOPEDIC SURGERY | Age: 61
End: 2021-12-01

## 2021-12-01 NOTE — TELEPHONE ENCOUNTER
Received call from [address] [place] [address] that they are unable to fill the medication for the  Hylan injection due to not being contracted with patient's insurance.

## 2021-12-01 NOTE — TELEPHONE ENCOUNTER
Called patient to return my phone call back. Pham called multiple phones to get answers on who will approve the injection. With denials or not correct company.

## 2021-12-02 ENCOUNTER — OFFICE VISIT (OUTPATIENT)
Dept: ORTHOPEDIC SURGERY | Age: 61
End: 2021-12-02
Payer: COMMERCIAL

## 2021-12-02 DIAGNOSIS — M25.561 PAIN IN BOTH KNEES, UNSPECIFIED CHRONICITY: Primary | ICD-10-CM

## 2021-12-02 DIAGNOSIS — M25.562 PAIN IN BOTH KNEES, UNSPECIFIED CHRONICITY: Primary | ICD-10-CM

## 2021-12-02 PROCEDURE — G8428 CUR MEDS NOT DOCUMENT: HCPCS | Performed by: ORTHOPAEDIC SURGERY

## 2021-12-02 PROCEDURE — G8419 CALC BMI OUT NRM PARAM NOF/U: HCPCS | Performed by: ORTHOPAEDIC SURGERY

## 2021-12-02 PROCEDURE — 99213 OFFICE O/P EST LOW 20 MIN: CPT | Performed by: ORTHOPAEDIC SURGERY

## 2021-12-02 PROCEDURE — 1036F TOBACCO NON-USER: CPT | Performed by: ORTHOPAEDIC SURGERY

## 2021-12-02 PROCEDURE — 3017F COLORECTAL CA SCREEN DOC REV: CPT | Performed by: ORTHOPAEDIC SURGERY

## 2021-12-02 PROCEDURE — 20610 DRAIN/INJ JOINT/BURSA W/O US: CPT | Performed by: ORTHOPAEDIC SURGERY

## 2021-12-02 PROCEDURE — G8484 FLU IMMUNIZE NO ADMIN: HCPCS | Performed by: ORTHOPAEDIC SURGERY

## 2021-12-02 RX ORDER — LIDOCAINE HYDROCHLORIDE 10 MG/ML
2 INJECTION, SOLUTION EPIDURAL; INFILTRATION; INTRACAUDAL; PERINEURAL ONCE
Status: COMPLETED | OUTPATIENT
Start: 2021-12-02 | End: 2021-12-02

## 2021-12-02 RX ORDER — BUPIVACAINE HYDROCHLORIDE 2.5 MG/ML
2 INJECTION, SOLUTION INFILTRATION; PERINEURAL ONCE
Status: COMPLETED | OUTPATIENT
Start: 2021-12-02 | End: 2021-12-02

## 2021-12-02 RX ADMIN — BUPIVACAINE HYDROCHLORIDE 5 MG: 2.5 INJECTION, SOLUTION INFILTRATION; PERINEURAL at 16:15

## 2021-12-02 RX ADMIN — LIDOCAINE HYDROCHLORIDE 2 ML: 10 INJECTION, SOLUTION EPIDURAL; INFILTRATION; INTRACAUDAL; PERINEURAL at 16:13

## 2021-12-02 RX ADMIN — BUPIVACAINE HYDROCHLORIDE 5 MG: 2.5 INJECTION, SOLUTION INFILTRATION; PERINEURAL at 16:16

## 2021-12-02 RX ADMIN — LIDOCAINE HYDROCHLORIDE 2 ML: 10 INJECTION, SOLUTION EPIDURAL; INFILTRATION; INTRACAUDAL; PERINEURAL at 16:14

## 2021-12-02 NOTE — PROGRESS NOTES
Carlin Nyhan M.D.            [address] [facility] [address] [facility],[address], [facility].           Dept Phone: [phone]           Dept Fax:  [phone], On license of [facility]          Dept Phone: [phone]           Dept Fax:  [phone]      Chief Compliant:  Chief Complaint   Patient presents with    Pain     Bilateral knees        History of Present Illness: This is a 57 y.o. female who presents to the clinic today for evaluation / follow up of bilateral knee pain. Please see prior dictations but she is noted to have DJD of both knees. She is basically here to have viscosupplementation. She did get insurance approval for Synvisc. She denies any recent injuries or traumas. .       Review of Systems   Constitutional: Negative for fever, chills, sweats. Eyes: Negative for changes in vision, or pain. HENT: Negative for ear ache, epistaxis, or sore throat. Respiratory/Cardio: Negative for Chest pain, palpitations, SOB, or cough. Gastrointestinal: Negative for abdominal pain, N/V/D. Genitourinary: Negative for dysuria, frequency, urgency, or hematuria. Neurological: Negative for headache, numbness, or weakness. Integumentary: Negative for rash, itching, laceration, or abrasion. Musculoskeletal: Positive for Pain (Bilateral knees)       Physical Exam:  Constitutional: Patient is oriented to person, place, and time. Patient appears well-developed and well nourished. HENT: Negative otherwise noted  Head: Normocephalic and Atraumatic  Nose: Normal  Eyes: Conjunctivae and EOM are normal  Neck: Normal range of motion Neck supple. Respiratory/Cardio: Effort normal. No respiratory distress. Musculoskeletal: Physical examination unchanged. Please see prior dictation  Neurological: Patient is alert and oriented to person, place, and time. Normal strenght. No sensory deficit. Skin: Skin is warm and dry  Psychiatric: Behavior is normal. Thought content normal.  Nursing note and vitals reviewed. Labs and Imaging:     XR taken today:  No results found. Orders Placed This Encounter   Procedures    ID ARTHROCENTESIS ASPIR&/INJ MAJOR JT/BURSA W/O US       Assessment and Plan:  1. Pain in both knees, unspecified chronicity    2. DJD both knees        This is a 57 y.o. female who presents to the clinic today for evaluation / follow up of DJD both knees. Past History:    Current Outpatient Medications:     rosuvastatin (CRESTOR) 5 MG tablet, Take 1 tablet by mouth nightly, Disp: 90 tablet, Rfl: 0    gabapentin (NEURONTIN) 300 MG capsule, TAKE ONE CAPSULE BY MOUTH THREE TIMES A DAY FOR 7 DAYS, Disp: 21 capsule, Rfl: 1    ibuprofen (ADVIL;MOTRIN) 800 MG tablet, Take 0.5 tablets by mouth 2 times daily as needed for Pain, Disp: 28 tablet, Rfl: 1    latanoprost (XALATAN) 0.005 % ophthalmic solution, Place 1 drop into the left eye nightly, Disp: , Rfl:     brimonidine-timolol (COMBIGAN) 0.2-0.5 % ophthalmic solution, Place 1 drop into the left eye every 12 hours, Disp: , Rfl:     timolol (TIMOPTIC-XE) 0.25 % ophthalmic gel-forming, Place 1 drop into the left eye daily, Disp: , Rfl:     prednisoLONE sodium phosphate (INFLAMASE FORTE) 1 % ophthalmic solution, Place 1 drop into the right eye Daily, Disp: , Rfl:     gabapentin (NEURONTIN) 300 MG capsule, Take 1 capsule by mouth 3 times daily for 7 days. , Disp: 21 capsule, Rfl: 0    Vibegron (GEMTESA) 75 MG TABS, Take 75 mg by mouth daily, Disp: , Rfl:     esomeprazole Magnesium (NEXIUM) 40 MG PACK, Take 40 mg by mouth 2 times daily, Disp: , Rfl:     busPIRone (BUSPAR) 5 MG tablet, Take 5 mg by mouth 3 times daily, Disp: , Rfl:     metoprolol tartrate (LOPRESSOR) 25 MG tablet, Take 25 mg by mouth 2 times daily, Disp: , Rfl:     rosuvastatin (CRESTOR) 5 MG tablet, Take 5 mg by mouth daily, Disp: , Rfl:     calcium carbonate-vitamin D3 (CALTRATE) 600-400 MG-UNIT TABS per tab, Take 600 mg by mouth 2 times daily, Disp: , Rfl:     Biotin 23963 MCG TABS, Take 1 tablet by mouth daily, Disp: , Rfl:     Wheat Dextrin-Calcium (BENEFIBER PLUS CALCIUM) POWD, Take 1 Dose by mouth daily, Disp: , Rfl:     busPIRone (BUSPAR) 5 MG tablet, TAKE 1 TABLET BY MOUTH THREE TIMES DAILY GENERIC EQUIVALENT FOR BUSPAR, Disp: , Rfl:     diclofenac sodium (VOLTAREN) 1 % GEL, Apply topically as needed, Disp: , Rfl:     neomycin-polymyxin-hydrocortisone 1 % SOLN otic solution, Place 3 drops in ear(s) 3 times daily, Disp: , Rfl:     tiZANidine (ZANAFLEX) 4 MG tablet, Take 4 mg by mouth every 8 hours as needed, Disp: , Rfl:     brimonidine (ALPHAGAN) 0.2 % ophthalmic solution, Place 1 drop into the left eye 2 times daily, Disp: , Rfl:     esomeprazole (NEXIUM) 40 MG delayed release capsule, Take 40 mg by mouth 2 times daily, Disp: , Rfl:     SIMPONI 50 MG/0.5ML SOAJ, Inject 50 mg into the muscle every 30 days, Disp: , Rfl:     latanoprost (XALATAN) 0.005 % ophthalmic solution, Place 1 drop into the left eye every evening, Disp: , Rfl:     leucovorin calcium (WELLCOVORIN) 5 MG tablet, 5 mg once a week, Disp: , Rfl:     methotrexate Sodium (RHEUMATREX) 50 MG/2ML chemo injection, 0.88 mg once a week, Disp: , Rfl:     metoprolol tartrate (LOPRESSOR) 25 MG tablet, Take 25 mg by mouth 2 times daily, Disp: , Rfl:     prednisoLONE acetate (PRED FORTE) 1 % ophthalmic suspension, Place 1 drop into the right eye every evening, Disp: , Rfl:     solifenacin (VESICARE) 5 MG tablet, Take 5 mg by mouth daily, Disp: , Rfl:     timolol (TIMOPTIC) 0.5 % ophthalmic solution, Place 1 drop into the left eye every morning, Disp: , Rfl:   Allergies   Allergen Reactions    Ambien [Zolpidem]     Ativan [Lorazepam]     Sulfa Antibiotics     Sulfa Antibiotics Rash     Social History     Socioeconomic History    Marital status:      Spouse name: Not on file    Number of children: Not on file    Years of education: Not on file    Highest education level: Not on file   Occupational History    Not on file   Tobacco Use    Smoking status: Never Smoker    Smokeless tobacco: Never Used   Substance and Sexual Activity    Alcohol use: Yes     Comment: socially    Drug use: Not on file    Sexual activity: Not on file   Other Topics Concern    Not on file   Social History Narrative    ** Merged History Encounter **          Social Determinants of Health     Financial Resource Strain:     Difficulty of Paying Living Expenses: Not on file   Food Insecurity: No Food Insecurity    Worried About Running Out of Food in the Last Year: Never true    Deuce of Food in the Last Year: Never true   Transportation Needs: No Transportation Needs    Lack of Transportation (Medical): No    Lack of Transportation (Non-Medical):  No   Physical Activity:     Days of Exercise per Week: Not on file    Minutes of Exercise per Session: Not on file   Stress:     Feeling of Stress : Not on file   Social Connections:     Frequency of Communication with Friends and Family: Not on file    Frequency of Social Gatherings with Friends and Family: Not on file    Attends Latter day Services: Not on file    Active Member of [address] [place] [address] [employer] or Organizations: Not on file    Attends Club or Organization Meetings: Not on file    Marital Status: Not on file   Intimate Partner Violence:     Fear of Current or Ex-Partner: Not on file    Emotionally Abused: Not on file    Physically Abused: Not on file    Sexually Abused: Not on file   Housing Stability:     Unable to Pay for Housing in the Last Year: Not on file    Number of [place] in the Last Year: Not on file    Unstable Housing in the Last Year: Not on file     Past Medical History:   Diagnosis Date    ADHD     Anxiety     Colon polyps     Depression     Diverticulitis     Gastroparesis     Glaucoma     Hiatal hernia     Hyperlipidemia     Hypertension     NUZHAT (obstructive sleep apnea)     Osteoporosis     RA (rheumatoid arthritis) (HCC)      Past Surgical History:   Procedure Laterality Date    CATARACT REMOVAL Right     FOOT FUSION Left     FOOT SURGERY Right     GLAUCOMA SURGERY      HAND SURGERY Right     JOINT REPLACEMENT Bilateral     TUBAL LIGATION       Family History   Problem Relation Age of Onset    Arthritis Mother     Uterine Cancer Mother     Atrial Fibrillation Mother     COPD Father     Diabetes Father     Hypertension Father     Prostate Cancer Father    Plan  An informed verbal consent for the procedure was obtained and risks including, but not limited to: allergy to medications, injection, bleeding, stiffness of joint, recurrence of symptoms, loss of function, swelling, drainage, irrigation, need for surgery and pseudo-septic inflammation, were explained to the patient. Also, discussed was the potential for further injections, irrigation and debridement and surgery. Alternate means of treatment have also been discussed with the patient. Under sterile conditions both knees were injected with lidocaine 2 cc bupivacaine 2 cc and then Synvisc 148 mg. Patient tolerated the procedures well. Patient was given post viscosupplementation instructions. We will make a tentative appointment for 4 months or call if she has problems prior to that time. Provider Attestation:  Leandro Veliz, personally performed the services described in this documentation. All medical record entries made by the scribe were at my direction and in my presence. I have reviewed the chart and discharge instructions and agree that the records reflect my personal performance and is accurate and complete. Melly Rodriguez MD. 12/02/21      Please note that this chart was generated using voice recognition Dragon dictation software.   Although every effort was made to ensure the accuracy of this automated transcription, some errors in transcription may have occurred.

## 2021-12-09 ENCOUNTER — HOSPITAL ENCOUNTER (OUTPATIENT)
Dept: MAMMOGRAPHY | Age: 61
Discharge: HOME | End: 2021-12-11
Payer: COMMERCIAL

## 2021-12-09 ENCOUNTER — HOSPITAL ENCOUNTER (OUTPATIENT)
Dept: ULTRASOUND IMAGING | Age: 61
Discharge: HOME | End: 2021-12-11
Payer: COMMERCIAL

## 2021-12-09 DIAGNOSIS — R92.8 ABNORMAL MAMMOGRAM: ICD-10-CM

## 2021-12-09 PROCEDURE — G0279 TOMOSYNTHESIS, MAMMO: HCPCS

## 2021-12-20 ENCOUNTER — OFFICE VISIT (OUTPATIENT)
Dept: ORTHOPEDIC SURGERY | Age: 61
End: 2021-12-20
Payer: COMMERCIAL

## 2021-12-20 VITALS
HEART RATE: 80 BPM | WEIGHT: 157 LBS | RESPIRATION RATE: 16 BRPM | DIASTOLIC BLOOD PRESSURE: 86 MMHG | BODY MASS INDEX: 27.82 KG/M2 | SYSTOLIC BLOOD PRESSURE: 131 MMHG | HEIGHT: 63 IN

## 2021-12-20 DIAGNOSIS — M19.171 POST-TRAUMATIC OSTEOARTHRITIS OF RIGHT ANKLE: Primary | ICD-10-CM

## 2021-12-20 DIAGNOSIS — M77.41 METATARSALGIA OF BOTH FEET: ICD-10-CM

## 2021-12-20 DIAGNOSIS — M77.42 METATARSALGIA OF BOTH FEET: ICD-10-CM

## 2021-12-20 PROCEDURE — G8427 DOCREV CUR MEDS BY ELIG CLIN: HCPCS | Performed by: FAMILY MEDICINE

## 2021-12-20 PROCEDURE — G8484 FLU IMMUNIZE NO ADMIN: HCPCS | Performed by: FAMILY MEDICINE

## 2021-12-20 PROCEDURE — 3017F COLORECTAL CA SCREEN DOC REV: CPT | Performed by: FAMILY MEDICINE

## 2021-12-20 PROCEDURE — 1036F TOBACCO NON-USER: CPT | Performed by: FAMILY MEDICINE

## 2021-12-20 PROCEDURE — 99213 OFFICE O/P EST LOW 20 MIN: CPT | Performed by: FAMILY MEDICINE

## 2021-12-20 PROCEDURE — G8419 CALC BMI OUT NRM PARAM NOF/U: HCPCS | Performed by: FAMILY MEDICINE

## 2021-12-20 NOTE — PROGRESS NOTES
Sports Medicine Consultation      CHIEF COMPLAINT:  Ankle Pain (Right ankle doing great, no concerns. Pt states she is now starting to have pain in her left foot and would like it to be looked at. Most of the pain is located in the ball of her foot. Had previous hammertoe surgeries (Dr. Nick Simmons) and has orthotics, but doesn't seem to be getting relief. Would like another opinion. )  . HPI:   The patient is a 57 y.o. female who is being seen in   established patient being seen for regarding follow up of a pre-existing problem  right foot/ankle pain. The patient states the pain has been present for 3+ months. As far as trauma to the area, the patient indicates no new trauma. There occ pain with weight bearing. The patient states numbness and tingling is not present. Catching and locking has not been present. She has tried PT and inj with relief. she has a past medical history of ADHD, Anxiety, Colon polyps, Depression, Diverticulitis, Gastroparesis, Glaucoma, Hiatal hernia, Hyperlipidemia, Hypertension, NUZHAT (obstructive sleep apnea), Osteoporosis, and RA (rheumatoid arthritis) ([facility] [address] she has a past surgical history that includes Foot surgery (Right); Hand surgery (Right); Tubal ligation; Cataract removal (Right); Glaucoma surgery; Foot Fusion (Left); and joint replacement (Bilateral). family history includes Arthritis in her mother; Atrial Fibrillation in her mother; COPD in her father; Diabetes in her father; Hypertension in her father; Prostate Cancer in her father; Uterine Cancer in her mother. Social History     Socioeconomic History    Marital status:      Spouse name: Not on file    Number of children: Not on file    Years of education: Not on file    Highest education level: Not on file   Occupational History    Not on file   Tobacco Use    Smoking status: Never Smoker    Smokeless tobacco: Never Used   Substance and Sexual Activity    Alcohol use:  Yes Comment: socially    Drug use: Not on file    Sexual activity: Not on file   Other Topics Concern    Not on file   Social History Narrative    ** Merged History Encounter **          Social Determinants of Health     Financial Resource Strain:     Difficulty of Paying Living Expenses: Not on file   Food Insecurity: No Food Insecurity    Worried About Running Out of Food in the Last Year: Never true    Deuce of Food in the Last Year: Never true   Transportation Needs: No Transportation Needs    Lack of Transportation (Medical): No    Lack of Transportation (Non-Medical):  No   Physical Activity:     Days of Exercise per Week: Not on file    Minutes of Exercise per Session: Not on file   Stress:     Feeling of Stress : Not on file   Social Connections:     Frequency of Communication with Friends and Family: Not on file    Frequency of Social Gatherings with Friends and Family: Not on file    Attends Syn[employer] Services: Not on file    Active Member of [address] [place] [address] or Organizations: Not on file    Attends Club or Organization Meetings: Not on file    Marital Status: Not on file   Intimate Partner Violence:     Fear of Current or Ex-Partner: Not on file    Emotionally Abused: Not on file    Physically Abused: Not on file    Sexually Abused: Not on file   Housing Stability:     Unable to Pay for Housing in the Last Year: Not on file    Number of [place] in the Last Year: Not on file    Unstable Housing in the Last Year: Not on file       Current Outpatient Medications   Medication Sig Dispense Refill    rosuvastatin (CRESTOR) 5 MG tablet Take 1 tablet by mouth nightly 90 tablet 0    gabapentin (NEURONTIN) 300 MG capsule TAKE ONE CAPSULE BY MOUTH THREE TIMES A DAY FOR 7 DAYS 21 capsule 1    ibuprofen (ADVIL;MOTRIN) 800 MG tablet Take 0.5 tablets by mouth 2 times daily as needed for Pain 28 tablet 1    latanoprost (XALATAN) 0.005 % ophthalmic solution Place 1 drop into the left eye nightly      brimonidine-timolol (COMBIGAN) 0.2-0.5 % ophthalmic solution Place 1 drop into the left eye every 12 hours      timolol (TIMOPTIC-XE) 0.25 % ophthalmic gel-forming Place 1 drop into the left eye daily      prednisoLONE sodium phosphate (INFLAMASE FORTE) 1 % ophthalmic solution Place 1 drop into the right eye Daily      gabapentin (NEURONTIN) 300 MG capsule Take 1 capsule by mouth 3 times daily for 7 days.  21 capsule 0    Vibegron (GEMTESA) 75 MG TABS Take 75 mg by mouth daily      esomeprazole Magnesium (NEXIUM) 40 MG PACK Take 40 mg by mouth 2 times daily      busPIRone (BUSPAR) 5 MG tablet Take 5 mg by mouth 3 times daily      metoprolol tartrate (LOPRESSOR) 25 MG tablet Take 25 mg by mouth 2 times daily      rosuvastatin (CRESTOR) 5 MG tablet Take 5 mg by mouth daily      calcium carbonate-vitamin D3 (CALTRATE) 600-400 MG-UNIT TABS per tab Take 600 mg by mouth 2 times daily      Biotin 09749 MCG TABS Take 1 tablet by mouth daily      Wheat Dextrin-Calcium (BENEFIBER PLUS CALCIUM) POWD Take 1 Dose by mouth daily      busPIRone (BUSPAR) 5 MG tablet TAKE 1 TABLET BY MOUTH THREE TIMES DAILY GENERIC EQUIVALENT FOR BUSPAR      diclofenac sodium (VOLTAREN) 1 % GEL Apply topically as needed      neomycin-polymyxin-hydrocortisone 1 % SOLN otic solution Place 3 drops in ear(s) 3 times daily      tiZANidine (ZANAFLEX) 4 MG tablet Take 4 mg by mouth every 8 hours as needed      brimonidine (ALPHAGAN) 0.2 % ophthalmic solution Place 1 drop into the left eye 2 times daily      esomeprazole (NEXIUM) 40 MG delayed release capsule Take 40 mg by mouth 2 times daily      SIMPONI 50 MG/0.5ML SOAJ Inject 50 mg into the muscle every 30 days      latanoprost (XALATAN) 0.005 % ophthalmic solution Place 1 drop into the left eye every evening      leucovorin calcium (WELLCOVORIN) 5 MG tablet 5 mg once a week      methotrexate Sodium (RHEUMATREX) 50 MG/2ML chemo injection 0.88 mg once a week      metoprolol tartrate (LOPRESSOR) 25 MG tablet Take 25 mg by mouth 2 times daily      prednisoLONE acetate (PRED FORTE) 1 % ophthalmic suspension Place 1 drop into the right eye every evening      solifenacin (VESICARE) 5 MG tablet Take 5 mg by mouth daily      timolol (TIMOPTIC) 0.5 % ophthalmic solution Place 1 drop into the left eye every morning       No current facility-administered medications for this visit. Allergies:  sheis allergic to ambien [zolpidem], ativan [lorazepam], sulfa antibiotics, and sulfa antibiotics. ROS:  CV:  Denies chest pain; palpitations; shortness of breath; swelling of feet, ankles; and loss of consciousness. CON: Denies fever and dizziness. ENT:  Denies hearing loss / ringing, ear infections hoarseness, and swallowing problems. RESP:  Denies chronic cough, spitting up blood, and asthma/wheezing. GI: Denies abdominal pain, change in bowel habits, nausea or vomiting, and blood in stools. :  Denies frequent urination, burning or painful urination, blood in the urine, and bladder incontinence. NEURO:  Denies headache, memory loss, sleep disturbance, and tremor or movement disorder. 11 system review of systems is otherwise negative unless noted in HPI    PHYSICAL EXAM:   /86 (Site: Left Upper Arm)   Pulse 80   Resp 16   Ht 5' 3\" (1.6 m)   Wt 157 lb (71.2 kg)   BMI 27.81 kg/m²   GENERAL: Dougie Daley is a 57 y.o. female who is alert and oriented and sitting comfortably in our office. SKIN:  Intact without rashes, lesions or ulcerations. No obvious deformity or swelling. NEURO: Musculoskeletal and axillary nerves intact to sensory and motor testing. EYES:  Extraocular muscles intact. MOUTH: Oral mucosa moist.  No perioral lesions. PULM:  Respirations unlabored and regular. VASC:  Capillary refill less than 3 seconds. Distal pulses are palpable. There is no lymphadenopathy. Ankle Exam:    Reveals there is not effusion. Swelling is  present. Edema is not present. Ecchymoses is not present. Palpation-Tenderness mtp left 2nd and 3rd mt heads  The foot is in planus alignment. ROM:  40 degrees plantarflexion and 20 degrees dorsiflexion. Subtalar motion is 30 degrees inversion and 20 degrees eversion. Strength-WNL  Sensation-normal to light touch  Special Tests-Ankle inversion: laxity negative  Ankle eversion: laxity negative  Ankle drawer: laxity negative  External rotation:negative  Syndesmotic Squeeze test: negative    Gait: Antalgic    PSYCH:  Patient has good fund of knowledge and displays understanding of exam.    RADIOLOGY:     IMPRESSION:     1. Post-traumatic osteoarthritis of right ankle    2. Metatarsalgia of both feet        PLAN:   We discussed some of the etiologies and natural histories of     ICD-10-CM    1. Post-traumatic osteoarthritis of right ankle  M19.171    2. Metatarsalgia of both feet  M77.41     M77.42     We discussed the various treatment alternatives including anti-inflammatory medications, physical therapy, injections, further imaging studies and as a last resort surgery. At this point patient's ankle joint is doing very well feels extremely right from a pain standpoint at the same time I do believe that some custom inserts would [place] help her foot for which she is getting in the next few days we will see her back otherwise as needed for her foot and we will see her back every 3 months for cortisone injections for her ankle as needed    No follow-ups on file. Please be aware portions of this note were completed using voice recognition software and unforeseen errors may have occurred    Electronically signed by Kita Nieves DO, FAOASM  on 12/20/21 at 10:17 AM EST    No orders of the defined types were placed in this encounter.

## 2022-04-07 ENCOUNTER — OFFICE VISIT (OUTPATIENT)
Dept: ORTHOPEDIC SURGERY | Age: 62
End: 2022-04-07
Payer: COMMERCIAL

## 2022-04-07 VITALS — HEIGHT: 63 IN | WEIGHT: 157 LBS | BODY MASS INDEX: 27.82 KG/M2

## 2022-04-07 DIAGNOSIS — M25.562 PAIN IN BOTH KNEES, UNSPECIFIED CHRONICITY: Primary | ICD-10-CM

## 2022-04-07 DIAGNOSIS — M25.561 PAIN IN BOTH KNEES, UNSPECIFIED CHRONICITY: Primary | ICD-10-CM

## 2022-04-07 PROCEDURE — G8427 DOCREV CUR MEDS BY ELIG CLIN: HCPCS | Performed by: ORTHOPAEDIC SURGERY

## 2022-04-07 PROCEDURE — 99212 OFFICE O/P EST SF 10 MIN: CPT | Performed by: ORTHOPAEDIC SURGERY

## 2022-04-07 PROCEDURE — 1036F TOBACCO NON-USER: CPT | Performed by: ORTHOPAEDIC SURGERY

## 2022-04-07 PROCEDURE — G8419 CALC BMI OUT NRM PARAM NOF/U: HCPCS | Performed by: ORTHOPAEDIC SURGERY

## 2022-04-07 PROCEDURE — 3017F COLORECTAL CA SCREEN DOC REV: CPT | Performed by: ORTHOPAEDIC SURGERY

## 2022-04-07 NOTE — PROGRESS NOTES
Lita Watkins returns today please see prior dictation she has a history of degenerative joint disease both knees moderate. She had Synvisc 1 injections this past December she says for 2 to 3 months she did well. She says he is back to where she was before    Examination was limited today. She is noted to have maintained her motion nicely on both sides 0 130 degrees no ligamentous instability appreciated nothing on Goyo's. No new x-rays taken today    Impression  Moderate degenerative joint disease both knees    Plan  I think the patient benefit from having another viscosupplementation. Patient was really requested that we try Durolane this time we will go ahead and put in prior authorization for this.

## 2022-04-12 ENCOUNTER — TELEPHONE (OUTPATIENT)
Dept: ORTHOPEDIC SURGERY | Age: 62
End: 2022-04-12

## 2022-04-12 NOTE — TELEPHONE ENCOUNTER
Started PA for synvisc one, patient wished to have durolane but this is not a cover medication by insurance plan.     Faxed in clinicals and PA

## 2022-04-15 ENCOUNTER — TELEPHONE (OUTPATIENT)
Dept: ORTHOPEDIC SURGERY | Age: 62
End: 2022-04-15

## 2022-04-29 ENCOUNTER — OFFICE VISIT (OUTPATIENT)
Dept: ORTHOPEDIC SURGERY | Age: 62
End: 2022-04-29
Payer: COMMERCIAL

## 2022-04-29 VITALS — RESPIRATION RATE: 16 BRPM | BODY MASS INDEX: 27.82 KG/M2 | HEIGHT: 63 IN | WEIGHT: 157 LBS

## 2022-04-29 DIAGNOSIS — M17.11 ARTHRITIS OF RIGHT KNEE: Primary | ICD-10-CM

## 2022-04-29 DIAGNOSIS — M17.12 ARTHRITIS OF LEFT KNEE: ICD-10-CM

## 2022-04-29 PROCEDURE — 20610 DRAIN/INJ JOINT/BURSA W/O US: CPT | Performed by: PHYSICIAN ASSISTANT

## 2022-04-29 RX ORDER — BUPIVACAINE HYDROCHLORIDE 5 MG/ML
2 INJECTION, SOLUTION PERINEURAL ONCE
Status: COMPLETED | OUTPATIENT
Start: 2022-04-29 | End: 2022-04-29

## 2022-04-29 RX ORDER — LIDOCAINE HYDROCHLORIDE 10 MG/ML
2 INJECTION, SOLUTION INFILTRATION; PERINEURAL ONCE
Status: COMPLETED | OUTPATIENT
Start: 2022-04-29 | End: 2022-04-29

## 2022-04-29 RX ORDER — METHYLPREDNISOLONE 4 MG/1
4 TABLET ORAL SEE ADMIN INSTRUCTIONS
Qty: 1 KIT | Refills: 0 | Status: SHIPPED | OUTPATIENT
Start: 2022-04-29 | End: 2022-05-05

## 2022-04-29 RX ADMIN — LIDOCAINE HYDROCHLORIDE 2 ML: 10 INJECTION, SOLUTION INFILTRATION; PERINEURAL at 11:44

## 2022-04-29 RX ADMIN — BUPIVACAINE HYDROCHLORIDE 10 MG: 5 INJECTION, SOLUTION PERINEURAL at 11:43

## 2022-04-29 NOTE — PROGRESS NOTES
[facility], [address] [place], [address] [place], [facility] [address]           Dept Phone: [phone]           Dept Fax:  [phone]           [place]          Dept Phone: [phone]           Dept Fax:  [phone]      Chief Compliant:  Chief Complaint   Patient presents with    Knee Pain     bilateral, synvisc- 12/2/21        History of Present Illness: This is a 57 y.o. female who presents to the clinic today for Synvisc One injection. Previous treatment includes steroid and Synvisc injections. Last injection was performed on 12/2/2021 bilateral knees she got good relief from these but only lasted about 2 to 3 months. She did see Dr. Edilma Almaguer earlier this month who recommended repeat Visco injections patient has had approval at this point. Patient denies any fever, chills, knee joint warmth, redness, numbness or tingling.     Past History:    Current Outpatient Medications:     methylPREDNISolone (MEDROL DOSEPACK) 4 MG tablet, Take 1 tablet by mouth See Admin Instructions for 6 days Take by mouth., Disp: 1 kit, Rfl: 0    Vibegron (GEMTESA) 75 MG TABS, Take 75 mg by mouth daily, Disp: 30 tablet, Rfl: 0    rosuvastatin (CRESTOR) 5 MG tablet, TAKE 1 TABLET NIGHTLY, Disp: 90 tablet, Rfl: 3    busPIRone (BUSPAR) 5 MG tablet, Take 1 tablet by mouth 3 times daily, Disp: 270 tablet, Rfl: 0    gabapentin (NEURONTIN) 300 MG capsule, TAKE ONE CAPSULE BY MOUTH THREE TIMES A DAY FOR 7 DAYS, Disp: 21 capsule, Rfl: 1    ibuprofen (ADVIL;MOTRIN) 800 MG tablet, Take 0.5 tablets by mouth 2 times daily as needed for Pain, Disp: 28 tablet, Rfl: 1    latanoprost (XALATAN) 0.005 % ophthalmic solution, Place 1 drop into the left eye nightly, Disp: , Rfl:     brimonidine-timolol (COMBIGAN) 0.2-0.5 % ophthalmic solution, Place 1 drop into the left eye every 12 hours, Disp: , Rfl:     timolol (TIMOPTIC-XE) 0.25 % ophthalmic gel-forming, Place 1 drop into the left eye daily, Disp: , Rfl:     prednisoLONE sodium phosphate (INFLAMASE FORTE) 1 % ophthalmic solution, Place 1 drop into the right eye Daily, Disp: , Rfl:     gabapentin (NEURONTIN) 300 MG capsule, Take 1 capsule by mouth 3 times daily for 7 days. , Disp: 21 capsule, Rfl: 0    esomeprazole Magnesium (NEXIUM) 40 MG PACK, Take 40 mg by mouth 2 times daily, Disp: , Rfl:     metoprolol tartrate (LOPRESSOR) 25 MG tablet, Take 25 mg by mouth 2 times daily, Disp: , Rfl:     rosuvastatin (CRESTOR) 5 MG tablet, Take 5 mg by mouth daily, Disp: , Rfl:     calcium carbonate-vitamin D3 (CALTRATE) 600-400 MG-UNIT TABS per tab, Take 600 mg by mouth 2 times daily, Disp: , Rfl:     Biotin 09860 MCG TABS, Take 1 tablet by mouth daily, Disp: , Rfl:     Wheat Dextrin-Calcium (BENEFIBER PLUS CALCIUM) POWD, Take 1 Dose by mouth daily, Disp: , Rfl:     busPIRone (BUSPAR) 5 MG tablet, TAKE 1 TABLET BY MOUTH THREE TIMES DAILY GENERIC EQUIVALENT FOR BUSPAR, Disp: , Rfl:     diclofenac sodium (VOLTAREN) 1 % GEL, Apply topically as needed, Disp: , Rfl:     neomycin-polymyxin-hydrocortisone 1 % SOLN otic solution, Place 3 drops in ear(s) 3 times daily, Disp: , Rfl:     tiZANidine (ZANAFLEX) 4 MG tablet, Take 4 mg by mouth every 8 hours as needed, Disp: , Rfl:     brimonidine (ALPHAGAN) 0.2 % ophthalmic solution, Place 1 drop into the left eye 2 times daily, Disp: , Rfl:     esomeprazole (NEXIUM) 40 MG delayed release capsule, Take 40 mg by mouth 2 times daily, Disp: , Rfl:     SIMPONI 50 MG/0.5ML SOAJ, Inject 50 mg into the muscle every 30 days, Disp: , Rfl:     latanoprost (XALATAN) 0.005 % ophthalmic solution, Place 1 drop into the left eye every evening, Disp: , Rfl:     leucovorin calcium (WELLCOVORIN) 5 MG tablet, 5 mg once a week, Disp: , Rfl:     methotrexate Sodium (RHEUMATREX) 50 MG/2ML chemo injection, 0.88 mg once a week, Disp: , Rfl:     metoprolol tartrate (LOPRESSOR) 25 MG tablet, Take 25 mg by mouth 2 times daily, Disp: , Rfl:     prednisoLONE acetate (PRED FORTE) 1 % ophthalmic suspension, Place 1 drop into the right eye every evening, Disp: , Rfl:     solifenacin (VESICARE) 5 MG tablet, Take 5 mg by mouth daily, Disp: , Rfl:     timolol (TIMOPTIC) 0.5 % ophthalmic solution, Place 1 drop into the left eye every morning, Disp: , Rfl:   Allergies   Allergen Reactions    Ambien [Zolpidem]     Ativan [Lorazepam]     Sulfa Antibiotics     Sulfa Antibiotics Rash     Social History     Socioeconomic History    Marital status:      Spouse name: Not on file    Number of children: Not on file    Years of education: Not on file    Highest education level: Not on file   Occupational History    Not on file   Tobacco Use    Smoking status: Never Smoker    Smokeless tobacco: Never Used   Substance and Sexual Activity    Alcohol use: Yes     Comment: socially    Drug use: Not on file    Sexual activity: Not on file   Other Topics Concern    Not on file   Social History Narrative    ** Merged History Encounter **          Social Determinants of Health     Financial Resource Strain:     Difficulty of Paying Living Expenses: Not on file   Food Insecurity: No Food Insecurity    Worried About Running Out of Food in the Last Year: Never true    Deuce of Food in the Last Year: Never true   Transportation Needs: No Transportation Needs    Lack of Transportation (Medical): No    Lack of Transportation (Non-Medical):  No   Physical Activity:     Days of Exercise per Week: Not on file    Minutes of Exercise per Session: Not on file   Stress:     Feeling of Stress : Not on file   Social Connections:     Frequency of Communication with Friends and Family: Not on file    Frequency of Social Gatherings with Friends and Family: Not on file    Attends Jewish Services: Not on file   [employer] of Clubs or Organizations: Not on file    Attends Club or Organization Meetings: Not on file    Marital Status: Not on file   Intimate Partner Violence:     Fear of Current or Ex-Partner: Not on file    Emotionally Abused: Not on file    Physically Abused: Not on file    Sexually Abused: Not on file   Housing Stability:     Unable to Pay for Housing in the Last Year: Not on file    Number of Places Lived in the Last Year: Not on file    Unstable Housing in the Last Year: Not on file     Past Medical History:   Diagnosis Date    ADHD     Anxiety     Colon polyps     Depression     Diverticulitis     Gastroparesis     Glaucoma     Hiatal hernia     Hyperlipidemia     Hypertension     NUZHAT (obstructive sleep apnea)     Osteoporosis     RA [facility]rheumatoid arthritis) ([facility] [address]      Past Surgical History:   Procedure Laterality Date    CATARACT REMOVAL Right     FOOT FUSION Left     FOOT SURGERY Right     GLAUCOMA SURGERY      HAND SURGERY Right     JOINT REPLACEMENT Bilateral     TUBAL LIGATION       Family History   Problem Relation Age of Onset    Arthritis Mother     Uterine Cancer Mother     Atrial Fibrillation Mother     COPD Father     Diabetes Father     Hypertension Father     Prostate Cancer Father         Review of Systems   Constitutional: Negative for fever, chills, sweats. Neurological: Negative for headache, numbness, or weakness. Integumentary: Negative for rash, itching, laceration, or abrasion. Musculoskeletal: Positive for Knee Pain (bilateral, synvisc- 12/2/21)       Physical Exam:  Constitutional: Patient is oriented to person, place, and time. Patient appears well-developed and well nourished. HENT: Negative otherwise noted  Head: Normocephalic and Atraumatic  Nose: Normal  Eyes: Conjunctivae and EOM are normal  Neck: Normal range of motion Neck supple. Respiratory/Cardio: Effort normal. No respiratory distress.   Musculoskeletal:    bilateral Knee    Swelling: Mild HIKMA    Patient Supplied?: No    Admin Date  04/29/2022  11:44 Action  Given Dose  2 mL Route  Intra-artICUlar Site   Administered By  Abe West MA    Ordering Provider: Kennis Mcardle, PA    NDC: 5437-9897-48    Lot#: [id number]    : Brody Tellez    Patient Supplied?: No                PLAN:   This is a 57 y.o. femalewith history of osteoarthritis of bilateral knee (s) who presents for synvisc One injection. 1. Synvisc One Injection given as outlined below  2. Patient is instructed to keep activity tonight to a minimum after being injected with viscosupplementation today. Instructed to ice and elevated area tonight and return to normal activity as tolerated tomorrow. Patient is educated on appropriate length of time to allow for visco to start to take affect. 3. RTC in 6 months patient may call return sooner for any questions or concerns    Procedure Note: Synvisc One Injection of bilateral knee (s)  An informed verbal consent for the procedure was obtained and risks including, but not limited to: allergy to medications, injection, bleeding, stiffness of joint, recurrence of symptoms, loss of function, swelling, drainage, irrigation, need for surgery and pseudo-septic inflammation, were explained to the patient. Also, discussed was the potential for further injections, irrigation and debridement and surgery. Alternate means of treatment have also been discussed with the patient. Following an appropriate discussion with the patient regarding the risks and benefits of the procedure she consented to proceed. her bilateral knees was prepped using betadine solution and alcohol swab. Using aseptic technique and through a lateral joint line approach, her bilateral knee (s) was injected superficially with 4 cc of 1% lidocaine without epinephrine and subsequently the Synvisc One solution was injected intra-articularly. A band aid was applied to the injection site.  she tolerated the injection with no immediate adverse reactions. Please note that this chart was generated using voice recognition Dragon dictation software. Although every effort was made to ensure the accuracy of this automated transcription, some errors in transcription may have occurred.

## 2022-06-03 ENCOUNTER — HOSPITAL ENCOUNTER (OUTPATIENT)
Age: 62
Setting detail: SPECIMEN
Discharge: HOME | End: 2022-06-03

## 2022-06-03 DIAGNOSIS — R53.83 OTHER FATIGUE: ICD-10-CM

## 2022-06-03 DIAGNOSIS — R73.01 IFG (IMPAIRED FASTING GLUCOSE): ICD-10-CM

## 2022-06-03 DIAGNOSIS — E78.49 OTHER HYPERLIPIDEMIA: ICD-10-CM

## 2022-06-03 LAB
CHOLESTEROL/HDL RATIO: 2.8
CHOLESTEROL: 193 MG/DL
CORTISOL COLLECTION INFO: ABNORMAL
CORTISOL: 1.7 UG/DL (ref 2.7–18.4)
ESTIMATED AVERAGE GLUCOSE: 117 MG/DL
ESTRADIOL LEVEL: <5 PG/ML (ref 5–50)
FOLLICLE STIMULATING HORMONE: 47.8 MIU/ML (ref 25.8–134.8)
HBA1C MFR BLD: 5.7 % (ref 4–6)
HDLC SERPL-MCNC: 68 MG/DL
LDL CHOLESTEROL: 92 MG/DL (ref 0–130)
LH: 34.2 MIU/ML (ref 7.7–58.5)
MAGNESIUM: 2 MG/DL (ref 1.6–2.6)
PROGESTERONE LEVEL: 0.21 NG/ML
PROLACTIN: 13.02 NG/ML (ref 4.79–23.3)
T3 FREE: 2.91 PG/ML (ref 2.02–4.43)
THYROXINE, FREE: 1.66 NG/DL (ref 0.93–1.7)
TRIGL SERPL-MCNC: 163 MG/DL
TSH SERPL DL<=0.05 MIU/L-ACNC: 0.64 UIU/ML (ref 0.3–5)
VITAMIN D 25-HYDROXY: 73.1 NG/ML

## 2022-06-06 LAB
IGF-1 COLLECTION INFO: NORMAL
SOMATOMEDIN C: 108 NG/ML (ref 51–187)

## 2022-06-07 LAB — DHEAS (DHEA SULFATE): <15 UG/DL (ref 13–130)

## 2022-06-16 LAB — ESTRONE: 5 PG/ML

## 2022-07-13 ENCOUNTER — HOSPITAL ENCOUNTER (OUTPATIENT)
Age: 62
Setting detail: SPECIMEN
Discharge: HOME | End: 2022-07-13

## 2022-07-13 DIAGNOSIS — N95.1 SYMPTOMATIC MENOPAUSAL OR FEMALE CLIMACTERIC STATES: ICD-10-CM

## 2022-07-13 LAB
CORTISOL COLLECTION INFO: NORMAL
CORTISOL: 16.7 UG/DL (ref 2.7–18.4)
ESTRADIOL LEVEL: 15 PG/ML (ref 5–50)
PROGESTERONE LEVEL: 0.26 NG/ML
SEX HORMONE BINDING GLOBULIN: 44 NMOL/L (ref 30–135)
VITAMIN D 25-HYDROXY: 51.9 NG/ML

## 2022-07-14 ENCOUNTER — OFFICE VISIT (OUTPATIENT)
Dept: ORTHOPEDIC SURGERY | Age: 62
End: 2022-07-14

## 2022-07-14 DIAGNOSIS — M17.12 ARTHRITIS OF LEFT KNEE: Primary | ICD-10-CM

## 2022-07-14 DIAGNOSIS — M17.11 ARTHRITIS OF RIGHT KNEE: ICD-10-CM

## 2022-07-14 LAB
DHEAS (DHEA SULFATE): 35.2 UG/DL (ref 13–130)
SEX HORMONE BINDING GLOBULIN: 43 NMOL/L (ref 30–135)
TESTOSTERONE FREE-NONMALE: 2 PG/ML (ref 0.6–3.8)
TESTOSTERONE TOTAL: 13 NG/DL (ref 20–70)

## 2022-07-14 PROCEDURE — 99213 OFFICE O/P EST LOW 20 MIN: CPT | Performed by: ORTHOPAEDIC SURGERY

## 2022-07-14 PROCEDURE — G8428 CUR MEDS NOT DOCUMENT: HCPCS | Performed by: ORTHOPAEDIC SURGERY

## 2022-07-14 PROCEDURE — 1036F TOBACCO NON-USER: CPT | Performed by: ORTHOPAEDIC SURGERY

## 2022-07-14 PROCEDURE — G8419 CALC BMI OUT NRM PARAM NOF/U: HCPCS | Performed by: ORTHOPAEDIC SURGERY

## 2022-07-14 PROCEDURE — 3017F COLORECTAL CA SCREEN DOC REV: CPT | Performed by: ORTHOPAEDIC SURGERY

## 2022-07-14 RX ORDER — METHYLPREDNISOLONE 4 MG/1
4 TABLET ORAL SEE ADMIN INSTRUCTIONS
Qty: 21 TABLET | Refills: 0 | Status: SHIPPED | OUTPATIENT
Start: 2022-07-14 | End: 2022-07-20

## 2022-07-14 NOTE — PROGRESS NOTES
Patrica Hernandes M.D.            [address] [facility],[address], [address] [facility]           Dept Phone: [phone]           Dept Fax:  [phone]           [place]          Dept Phone: [phone]           Dept Fax:  [phone]      Chief Compliant:  Chief Complaint   Patient presents with    Pain     both knees        History of Present Illness: This is a 57 y.o. female who presents to the clinic today for evaluation / follow up of bilateral knee pain. Nate Jacobs is a 59-year-old female who is been followed for quite some time for bilateral knee pain primarily patellofemoral in nature. She has had 2 Synvisc 1 injections. The first 1 worked out for several months but the more recent one she had only worked out for couple weeks. She describes obvious anterior knee pain with problems with ascending descending stairs and prolonged chair sitting does not describe any sharp stabbing catching pain sensation. Occasionally she has some swelling. Denies any radicular symptoms denies any groin or lateral hip pain. Review of Systems   Constitutional: Negative for fever, chills, sweats. Eyes: Negative for changes in vision, or pain. HENT: Negative for ear ache, epistaxis, or sore throat. Respiratory/Cardio: Negative for Chest pain, palpitations, SOB, or cough. Gastrointestinal: Negative for abdominal pain, N/V/D. Genitourinary: Negative for dysuria, frequency, urgency, or hematuria. Neurological: Negative for headache, numbness, or weakness. Integumentary: Negative for rash, itching, laceration, or abrasion. Musculoskeletal: Positive for Pain (both knees)       Physical Exam:  Constitutional: Patient is oriented to person, place, and time. Patient appears well-developed and well nourished.    HENT: Negative otherwise noted  Head: Normocephalic and Atraumatic  Nose: Normal  Eyes: Conjunctivae and EOM are normal  Neck: Normal range of motion Neck supple. Respiratory/Cardio: Effort normal. No respiratory distress. Musculoskeletal: Examination of the patient's right knee notes very minimal if any swelling she does have significant patellofemoral crepitus with knee flexion however she has maintained excellent motion 0 to 135 degrees Goyo's is negative Lachman's negative collaterals are negative. Patient's left knee examination identical to her right. Neurological: Patient is alert and oriented to person, place, and time. Normal strenght. No sensory deficit. Skin: Skin is warm and dry  Psychiatric: Behavior is normal. Thought content normal.  Nursing note and vitals reviewed. Labs and Imaging:     XR taken today:  XR KNEE BILATERAL STANDING    Result Date: 7/14/2022  X-rays taken today reviewed by me show standing AP both knees. Patient has some mild to early moderate medial joint space narrowing of both knees. There is been minimal progression however especially has no obvious acute process and. Orders Placed This Encounter   Procedures    XR KNEE BILATERAL STANDING     Standing Status:   Future     Number of Occurrences:   1     Standing Expiration Date:   7/14/2023       Assessment and Plan:  1. Arthritis of left knee    2. Arthritis of right knee            This is a 57 y.o. female who presents to the clinic today for evaluation / follow up of bilateral anterior knee pain primarily patellofemoral arthritis in nature.      Past History:    Current Outpatient Medications:     methylPREDNISolone (MEDROL, DANIEL,) 4 MG tablet, Take 1 tablet by mouth See Admin Instructions for 6 days Take by mouth., Disp: 21 tablet, Rfl: 0    buPROPion (WELLBUTRIN XL) 150 MG extended release tablet, Take 1 tablet by mouth every morning, Disp: 30 tablet, Rfl: 1    Vibegron (GEMTESA) 75 MG TABS, Take 75 mg by mouth daily, Disp: 30 tablet, Rfl: 2    metoprolol tartrate (LOPRESSOR) 25 MG tablet, Take 1 tablet by mouth 2 times daily, Disp: 60 tablet, Rfl: 2    denosumab (PROLIA) 60 MG/ML SOSY SC injection, , Disp: , Rfl:     busPIRone (BUSPAR) 5 MG tablet, Take 1 tablet by mouth 3 times daily, Disp: 270 tablet, Rfl: 0    rosuvastatin (CRESTOR) 5 MG tablet, TAKE 1 TABLET NIGHTLY, Disp: 90 tablet, Rfl: 3    ibuprofen (ADVIL;MOTRIN) 800 MG tablet, Take 0.5 tablets by mouth 2 times daily as needed for Pain, Disp: 28 tablet, Rfl: 1    latanoprost (XALATAN) 0.005 % ophthalmic solution, Place 1 drop into the left eye nightly, Disp: , Rfl:     brimonidine-timolol (COMBIGAN) 0.2-0.5 % ophthalmic solution, Place 1 drop into the left eye every 12 hours, Disp: , Rfl:     timolol (TIMOPTIC-XE) 0.25 % ophthalmic gel-forming, Place 1 drop into the left eye daily, Disp: , Rfl:     prednisoLONE sodium phosphate (INFLAMASE FORTE) 1 % ophthalmic solution, Place 1 drop into the right eye Daily, Disp: , Rfl:     esomeprazole Magnesium (NEXIUM) 40 MG PACK, Take 40 mg by mouth 2 times daily, Disp: , Rfl:     metoprolol tartrate (LOPRESSOR) 25 MG tablet, Take 25 mg by mouth 2 times daily, Disp: , Rfl:     rosuvastatin (CRESTOR) 5 MG tablet, Take 5 mg by mouth daily, Disp: , Rfl:     calcium carbonate-vitamin D3 (CALTRATE) 600-400 MG-UNIT TABS per tab, Take 600 mg by mouth 2 times daily, Disp: , Rfl:     Biotin 47041 MCG TABS, Take 1 tablet by mouth daily, Disp: , Rfl:     Wheat Dextrin-Calcium (BENEFIBER PLUS CALCIUM) POWD, Take 1 Dose by mouth daily, Disp: , Rfl:     diclofenac sodium (VOLTAREN) 1 % GEL, Apply topically as needed, Disp: , Rfl:     tiZANidine (ZANAFLEX) 4 MG tablet, Take 4 mg by mouth every 8 hours as needed, Disp: , Rfl:     brimonidine (ALPHAGAN) 0.2 % ophthalmic solution, Place 1 drop into the left eye 2 times daily, Disp: , Rfl:     esomeprazole (NEXIUM) 40 MG delayed release capsule, Take 40 mg by mouth 2 times daily, Disp: , Rfl:    SIMPONI 50 MG/0.5ML SOAJ, Inject 50 mg into the muscle every 30 days, Disp: , Rfl:     latanoprost (XALATAN) 0.005 % ophthalmic solution, Place 1 drop into the left eye every evening, Disp: , Rfl:     leucovorin calcium (WELLCOVORIN) 5 MG tablet, 5 mg once a week, Disp: , Rfl:     methotrexate Sodium (RHEUMATREX) 50 MG/2ML chemo injection, 0.88 mg once a week, Disp: , Rfl:     prednisoLONE acetate (PRED FORTE) 1 % ophthalmic suspension, Place 1 drop into the right eye every evening, Disp: , Rfl:     timolol (TIMOPTIC) 0.5 % ophthalmic solution, Place 1 drop into the left eye every morning, Disp: , Rfl:   Allergies   Allergen Reactions    Ambien [Zolpidem]     Ativan [Lorazepam]     Sulfa Antibiotics     Sulfa Antibiotics Rash     Social History     Socioeconomic History    Marital status:      Spouse name: Not on file    Number of children: Not on file    Years of education: Not on file    Highest education level: Not on file   Occupational History    Not on file   Tobacco Use    Smoking status: Never Smoker    Smokeless tobacco: Never Used   Substance and Sexual Activity    Alcohol use: Yes     Comment: socially    Drug use: Not on file    Sexual activity: Not on file   Other Topics Concern    Not on file   Social History Narrative    ** Merged History Encounter **          Social Determinants of Health     Financial Resource Strain: Low Risk     Difficulty of Paying Living Expenses: Not hard at all   Food Insecurity: No Food Insecurity    Worried About Running Out of Food in the Last Year: Never true    Deuce of Food in the Last Year: Never true   Transportation Needs:     Lack of Transportation (Medical): Not on file    Lack of Transportation (Non-Medical):  Not on file   Physical Activity:     Days of Exercise per Week: Not on file    Minutes of Exercise per Session: Not on file   Stress:     Feeling of Stress : Not on file   Social Connections:     Frequency of Communication with Friends and Family: Not on file    Frequency of Social Gatherings with Friends and Family: Not on file    Attends Worship Services: Not on file    Active Member of Clubs or Organizations: Not on file    Attends Club or Organization Meetings: Not on file    Marital Status: Not on file   Intimate Partner Violence:     Fear of Current or Ex-Partner: Not on file    Emotionally Abused: Not on file    Physically Abused: Not on file    Sexually Abused: Not on file   Housing Stability:     Unable to Pay for Housing in the Last Year: Not on file    Number of Places Lived in the Last Year: Not on file    Unstable Housing in the Last Year: Not on file     Past Medical History:   Diagnosis Date    ADHD     Anxiety     Colon polyps     Depression     Diverticulitis     Gastroparesis     Glaucoma     Hiatal hernia     Hyperlipidemia     Hypertension     NUZHAT (obstructive sleep apnea)     Osteoporosis     RA (rhe[facility]oid arthritis) ([facility] [address]      Past Surgical History:   Procedure Laterality Date    CATARACT REMOVAL Right     FOOT FUSION Left     FOOT SURGERY Right     GLAUCOMA SURGERY      HAND SURGERY Right     JOINT REPLACEMENT Bilateral     TUBAL LIGATION       Family History   Problem Relation Age of Onset    Arthritis Mother     Uterine Cancer Mother     Atrial Fibrillation Mother     COPD Father     Diabetes Father     Hypertension Father     Prostate Cancer Father    Plan  Patient requested a Medrol Dosepak as she is going on a trip. Doing a lot of walking. In the meantime she would like to try a different viscosupplementation. She had a friend who had a Euflexxa series and did very well. Record put in for authorization for this      Provider Attestation:  Christoph Hansen, personally performed the services described in this documentation. All medical record entries made by the scribe were at my direction and in my presence.  I have reviewed the chart and discharge instructions and agree that the records reflect my personal performance and is accurate and complete. Alicja Sousa MD. 07/14/22      Please note that this chart was generated using voice recognition Dragon dictation software. Although every effort was made to ensure the accuracy of this automated transcription, some errors in transcription may have occurred.

## 2022-07-27 LAB — ESTRONE: 25.4 PG/ML

## 2022-09-15 ENCOUNTER — TELEPHONE (OUTPATIENT)
Dept: ORTHOPEDIC SURGERY | Age: 62
End: 2022-09-15

## 2022-09-15 NOTE — TELEPHONE ENCOUNTER
Insurance called to confirm the date of patients last visco injections in her knees. 4/29/22 date was provided. Insurance rep stated that an approval letter will be sent stating that the injections will be approved but not until 10/30/22 because injections have to be at least 6 months apart.

## 2022-09-22 ENCOUNTER — OFFICE VISIT (OUTPATIENT)
Dept: GASTROENTEROLOGY | Age: 62
End: 2022-09-22
Payer: COMMERCIAL

## 2022-09-22 VITALS
DIASTOLIC BLOOD PRESSURE: 78 MMHG | BODY MASS INDEX: 27.99 KG/M2 | WEIGHT: 158 LBS | SYSTOLIC BLOOD PRESSURE: 126 MMHG | HEART RATE: 73 BPM

## 2022-09-22 DIAGNOSIS — K21.9 CHRONIC GERD: Primary | ICD-10-CM

## 2022-09-22 DIAGNOSIS — Z87.39 HISTORY OF RHEUMATOID ARTHRITIS: ICD-10-CM

## 2022-09-22 PROCEDURE — 99204 OFFICE O/P NEW MOD 45 MIN: CPT | Performed by: INTERNAL MEDICINE

## 2022-09-22 PROCEDURE — 3017F COLORECTAL CA SCREEN DOC REV: CPT | Performed by: INTERNAL MEDICINE

## 2022-09-22 PROCEDURE — G8419 CALC BMI OUT NRM PARAM NOF/U: HCPCS | Performed by: INTERNAL MEDICINE

## 2022-09-22 PROCEDURE — G8427 DOCREV CUR MEDS BY ELIG CLIN: HCPCS | Performed by: INTERNAL MEDICINE

## 2022-09-22 PROCEDURE — 1036F TOBACCO NON-USER: CPT | Performed by: INTERNAL MEDICINE

## 2022-09-22 ASSESSMENT — ENCOUNTER SYMPTOMS
ALLERGIC/IMMUNOLOGIC NEGATIVE: 1
RESPIRATORY NEGATIVE: 1
ABDOMINAL DISTENTION: 1

## 2022-09-22 NOTE — PROGRESS NOTES
Reason for Referral:   No referring provider defined for this encounter. No chief complaint on file. No diagnosis found. HISTORY OF PRESENT ILLNESS:   Patient seen with the symptoms of chronic GERD. Patient states that she has GERD symptoms for more than 5 years. Has been taking Nexium 40 mg twice a day and still was getting breakthrough symptoms. However no dysphagia. No symptoms suggestive of extra esophageal manifestation of GERD. She has gained some weight recently. Mild dyspeptic symptoms. Also has history of abdominal bloating. Has satisfactory bowel movements without melena, hematochezia, constipation etc.  Good appetite. Patient gives history of colonoscopy 2 years ago and was told that she interval colonoscopy was 10 years later. Her last EGD was more than 5 years ago and at that time she was told that she may have hiatal hernia. Patient has rheumatoid arthritis and has been on methotrexate. Apparently she is having liver test done every 2 months and I do not have access for the results. Patient is advised to get a copy for her files. Past Medical,Family, and Social History reviewed and does contribute to the patient presentingcondition. Patient's PMH/PSH,SH,PSYCH Hx, MEDs, ALLERGIES, and ROS were all reviewed and updated in the appropriate sections.     PAST MEDICAL HISTORY:  Past Medical History:   Diagnosis Date    ADHD     Anxiety     Colon polyps     Depression     Diverticulitis     Gastroparesis     Glaucoma     Hiatal hernia     Hyperlipidemia     Hypertension     NUZHAT (obstructive sleep apnea)     Osteoporosis     RA (rheumatoid arthritis) ([facility] [address]        Past Surgical History:   Procedure Laterality Date    CATARACT REMOVAL Right     FOOT FUSION Left     FOOT SURGERY Right     GLAUCOMA SURGERY      HAND SURGERY Right     JOINT REPLACEMENT Bilateral     TUBAL LIGATION         CURRENT MEDICATIONS:    Current Outpatient Medications:     metoprolol Take 1 Dose by mouth daily, Disp: , Rfl:     diclofenac sodium (VOLTAREN) 1 % GEL, Apply topically as needed, Disp: , Rfl:     tiZANidine (ZANAFLEX) 4 MG tablet, Take 4 mg by mouth every 8 hours as needed, Disp: , Rfl:     brimonidine (ALPHAGAN) 0.2 % ophthalmic solution, Place 1 drop into the left eye 2 times daily, Disp: , Rfl:     esomeprazole (NEXIUM) 40 MG delayed release capsule, Take 40 mg by mouth 2 times daily, Disp: , Rfl:     SIMPONI 50 MG/0.5ML SOAJ, Inject 50 mg into the muscle every 30 days, Disp: , Rfl:     latanoprost (XALATAN) 0.005 % ophthalmic solution, Place 1 drop into the left eye every evening, Disp: , Rfl:     leucovorin calcium (WELLCOVORIN) 5 MG tablet, 5 mg once a week, Disp: , Rfl:     methotrexate Sodium (RHEUMATREX) 50 MG/2ML chemo injection, 0.88 mg once a week, Disp: , Rfl:     prednisoLONE acetate (PRED FORTE) 1 % ophthalmic suspension, Place 1 drop into the right eye every evening, Disp: , Rfl:     timolol (TIMOPTIC) 0.5 % ophthalmic solution, Place 1 drop into the left eye every morning, Disp: , Rfl:     ALLERGIES:   Allergies   Allergen Reactions    Ambien [Zolpidem]     Ativan [Lorazepam]     Sulfa Antibiotics     Sulfa Antibiotics Rash       FAMILY HISTORY:       Problem Relation Age of Onset    Arthritis Mother     Uterine Cancer Mother     Atrial Fibrillation Mother     COPD Father     Diabetes Father     Hypertension Father     Prostate Cancer Father          SOCIAL HISTORY:   Social History     Socioeconomic History    Marital status:      Spouse name: Not on file    Number of children: Not on file    Years of education: Not on file    Highest education level: Not on file   Occupational History    Not on file   Tobacco Use    Smoking status: Never    Smokeless tobacco: Never   Substance and Sexual Activity    Alcohol use: Yes     Comment: socially    Drug use: Not on file    Sexual activity: Not on file   Other Topics Concern    Not on file   Social History Narrative ** Merged History Encounter **          Social Determinants of Health     Financial Resource Strain: Low Risk     Difficulty of Paying Living Expenses: Not hard at all   Food Insecurity: No Food Insecurity    Worried About Running Out of Food in the Last Year: Never true    Ran Out of Food in the Last Year: Never true   Transportation Needs: Not on file   Physical Activity: Not on file   Stress: Not on file   Social Connections: Not on file   Intimate Partner Violence: Not on file   Housing Stability: Not on file       REVIEW OF SYSTEMS:       Review of Systems   Constitutional: Negative. HENT: Negative. Eyes:  Positive for visual disturbance (glasses). Respiratory: Negative. Cardiovascular: Negative. Gastrointestinal:  Positive for abdominal distention. Endocrine: Negative. Genitourinary: Negative. Musculoskeletal: Negative. Skin: Negative. Allergic/Immunologic: Negative. Neurological: Negative. Hematological: Negative. Psychiatric/Behavioral: Negative. PHYSICAL EXAMINATION: Vital signs reviewed per the nursing documentation. There were no vitals taken for this visit. There is no height or weight on file to calculate BMI. Physical Exam  Vitals and nursing note reviewed. Constitutional:       Appearance: She is well-developed. HENT:      Head: Normocephalic and atraumatic. Eyes:      General: No scleral icterus. Conjunctiva/sclera: Conjunctivae normal.      Pupils: Pupils are equal, round, and reactive to light. Neck:      Thyroid: No thyromegaly. Vascular: No hepatojugular reflux or JVD. Trachea: No tracheal deviation. Cardiovascular:      Rate and Rhythm: Normal rate and regular rhythm. Heart sounds: Normal heart sounds. Pulmonary:      Effort: Pulmonary effort is normal. No respiratory distress. Breath sounds: Normal breath sounds. No wheezing or rales.    Abdominal:      General: Bowel sounds are normal. There is no distension. Palpations: Abdomen is soft. There is no hepatomegaly or mass. Tenderness: There is no abdominal tenderness. There is no rebound. Hernia: No hernia is present. Comments: Mildly obese abdomen. Musculoskeletal:         General: No tenderness. Cervical back: Normal range of motion and neck supple. Comments: No joint swelling   Lymphadenopathy:      Cervical: No cervical adenopathy. Skin:     General: Skin is warm. Findings: No bruising, ecchymosis, erythema or rash. Neurological:      Mental Status: She is alert and oriented to person, place, and time. Cranial Nerves: No cranial nerve deficit. Psychiatric:         Thought Content: Thought content normal.         LABORATORY DATA: Reviewed  Lab Results   Component Value Date    WBC 6.4 09/09/2021    HGB 11.8 (L) 09/09/2021    HCT 36.4 09/09/2021    MCV 84.7 09/09/2021     09/09/2021     (L) 09/09/2021    K 3.9 09/09/2021     09/09/2021    CO2 21 09/09/2021    BUN 15 09/09/2021    CREATININE 0.74 09/09/2021    LABALBU 4.4 05/11/2021    BILITOT 0.3 05/11/2021    ALKPHOS 55 05/11/2021    AST 43 05/11/2021    ALT 49 (H) 05/11/2021    INR 0.9 09/08/2021         Lab Results   Component Value Date    RBC 4.30 09/09/2021    HGB 11.8 (L) 09/09/2021    MCV 84.7 09/09/2021    MCH 27.4 09/09/2021    MCHC 32.4 09/09/2021    RDW 14.0 09/09/2021    MPV 9.2 09/09/2021    BASOPCT 0.8 05/11/2021    LYMPHSABS 1.61 05/11/2021    MONOSABS 0.66 05/11/2021    NEUTROABS 2.40 05/11/2021    EOSABS 0.20 05/11/2021    BASOSABS 0.04 05/11/2021         DIAGNOSTIC TESTING:     No results found. IMPRESSION: Ms. Val Joshua is a 57 y.o. female with     Assessment:  No diagnosis found. Plan:    Patient has chronic GERD and has been taking Nexium twice a day with breakthrough symptoms. Not clear whether really she has esophagitis or has Mon's etc.  She needs investigation including EGD.   Discussed with the patient regarding the procedure, risks and benefits. After discussion patient understood and verbalized agreement. Meanwhile advised to follow antireflux measures. To lose some weight. Spent 30 minutes providing patient education and counseling. Thank you for allowing me to participate in the care of Ms. Alayna Greenfield. For any further questions please do not hesitate to contact me. Note is dictated utilizing voice recognition software. Unfortunately this leads to occasional typographical errors. Please contact our office if you have any questions. I have reviewed and agree with the MA/LPN ROS.      Hieu Black MD, [facility]  Board Certified in Gastroenterology and [facility]  Office #: [phone]

## 2022-10-19 ENCOUNTER — ANESTHESIA EVENT (OUTPATIENT)
Dept: OPERATING ROOM | Age: 62
End: 2022-10-19
Payer: COMMERCIAL

## 2022-10-20 ENCOUNTER — HOSPITAL ENCOUNTER (OUTPATIENT)
Age: 62
Setting detail: OUTPATIENT SURGERY
Discharge: HOME | End: 2022-10-20
Attending: INTERNAL MEDICINE | Admitting: INTERNAL MEDICINE
Payer: COMMERCIAL

## 2022-10-20 ENCOUNTER — ANESTHESIA (OUTPATIENT)
Dept: OPERATING ROOM | Age: 62
End: 2022-10-20
Payer: COMMERCIAL

## 2022-10-20 VITALS
SYSTOLIC BLOOD PRESSURE: 132 MMHG | DIASTOLIC BLOOD PRESSURE: 78 MMHG | HEIGHT: 63 IN | RESPIRATION RATE: 19 BRPM | BODY MASS INDEX: 27.29 KG/M2 | HEART RATE: 76 BPM | TEMPERATURE: 97.7 F | OXYGEN SATURATION: 98 % | WEIGHT: 154 LBS

## 2022-10-20 DIAGNOSIS — K21.9 GASTROESOPHAGEAL REFLUX DISEASE, UNSPECIFIED WHETHER ESOPHAGITIS PRESENT: ICD-10-CM

## 2022-10-20 PROCEDURE — 2500000003 HC RX 250 WO HCPCS: Performed by: NURSE ANESTHETIST, CERTIFIED REGISTERED

## 2022-10-20 PROCEDURE — 43239 EGD BIOPSY SINGLE/MULTIPLE: CPT | Performed by: INTERNAL MEDICINE

## 2022-10-20 PROCEDURE — 3700000001 HC ADD 15 MINUTES (ANESTHESIA): Performed by: INTERNAL MEDICINE

## 2022-10-20 PROCEDURE — 7100000010 HC PHASE II RECOVERY - FIRST 15 MIN: Performed by: INTERNAL MEDICINE

## 2022-10-20 PROCEDURE — 3700000000 HC ANESTHESIA ATTENDED CARE: Performed by: INTERNAL MEDICINE

## 2022-10-20 PROCEDURE — 2709999900 HC NON-CHARGEABLE SUPPLY: Performed by: INTERNAL MEDICINE

## 2022-10-20 PROCEDURE — 88305 TISSUE EXAM BY PATHOLOGIST: CPT

## 2022-10-20 PROCEDURE — 2580000003 HC RX 258: Performed by: STUDENT IN AN ORGANIZED HEALTH CARE EDUCATION/TRAINING PROGRAM

## 2022-10-20 PROCEDURE — 7100000011 HC PHASE II RECOVERY - ADDTL 15 MIN: Performed by: INTERNAL MEDICINE

## 2022-10-20 PROCEDURE — 3609012400 HC EGD TRANSORAL BIOPSY SINGLE/MULTIPLE: Performed by: INTERNAL MEDICINE

## 2022-10-20 PROCEDURE — 6360000002 HC RX W HCPCS: Performed by: NURSE ANESTHETIST, CERTIFIED REGISTERED

## 2022-10-20 RX ORDER — SODIUM CHLORIDE, SODIUM LACTATE, POTASSIUM CHLORIDE, CALCIUM CHLORIDE 600; 310; 30; 20 MG/100ML; MG/100ML; MG/100ML; MG/100ML
INJECTION, SOLUTION INTRAVENOUS CONTINUOUS
Status: DISCONTINUED | OUTPATIENT
Start: 2022-10-20 | End: 2022-10-20 | Stop reason: HOSPADM

## 2022-10-20 RX ORDER — SODIUM CHLORIDE 0.9 % (FLUSH) 0.9 %
5-40 SYRINGE (ML) INJECTION EVERY 12 HOURS SCHEDULED
Status: DISCONTINUED | OUTPATIENT
Start: 2022-10-20 | End: 2022-10-20 | Stop reason: HOSPADM

## 2022-10-20 RX ORDER — SODIUM CHLORIDE 9 MG/ML
INJECTION, SOLUTION INTRAVENOUS PRN
Status: DISCONTINUED | OUTPATIENT
Start: 2022-10-20 | End: 2022-10-20 | Stop reason: HOSPADM

## 2022-10-20 RX ORDER — PROPOFOL 10 MG/ML
INJECTION, EMULSION INTRAVENOUS PRN
Status: DISCONTINUED | OUTPATIENT
Start: 2022-10-20 | End: 2022-10-20 | Stop reason: SDUPTHER

## 2022-10-20 RX ORDER — LIDOCAINE HYDROCHLORIDE 10 MG/ML
1 INJECTION, SOLUTION EPIDURAL; INFILTRATION; INTRACAUDAL; PERINEURAL
Status: DISCONTINUED | OUTPATIENT
Start: 2022-10-20 | End: 2022-10-20 | Stop reason: HOSPADM

## 2022-10-20 RX ORDER — LIDOCAINE HYDROCHLORIDE 10 MG/ML
INJECTION, SOLUTION EPIDURAL; INFILTRATION; INTRACAUDAL; PERINEURAL PRN
Status: DISCONTINUED | OUTPATIENT
Start: 2022-10-20 | End: 2022-10-20 | Stop reason: SDUPTHER

## 2022-10-20 RX ORDER — SODIUM CHLORIDE 0.9 % (FLUSH) 0.9 %
5-40 SYRINGE (ML) INJECTION PRN
Status: DISCONTINUED | OUTPATIENT
Start: 2022-10-20 | End: 2022-10-20 | Stop reason: HOSPADM

## 2022-10-20 RX ORDER — SODIUM CHLORIDE 9 MG/ML
INJECTION, SOLUTION INTRAVENOUS CONTINUOUS
Status: DISCONTINUED | OUTPATIENT
Start: 2022-10-20 | End: 2022-10-20

## 2022-10-20 RX ADMIN — PROPOFOL 75 MG: 10 INJECTION, EMULSION INTRAVENOUS at 08:38

## 2022-10-20 RX ADMIN — SODIUM CHLORIDE, POTASSIUM CHLORIDE, SODIUM LACTATE AND CALCIUM CHLORIDE: 600; 310; 30; 20 INJECTION, SOLUTION INTRAVENOUS at 07:47

## 2022-10-20 RX ADMIN — PROPOFOL 50 MG: 10 INJECTION, EMULSION INTRAVENOUS at 08:43

## 2022-10-20 RX ADMIN — LIDOCAINE HYDROCHLORIDE 50 MG: 10 INJECTION, SOLUTION EPIDURAL; INFILTRATION; INTRACAUDAL; PERINEURAL at 08:38

## 2022-10-20 ASSESSMENT — PAIN - FUNCTIONAL ASSESSMENT: PAIN_FUNCTIONAL_ASSESSMENT: 0-10

## 2022-10-20 NOTE — DISCHARGE INSTRUCTIONS
To see in the office in the next 3 to 4 weeks    Antireflux measures      Upper GI Endoscopy: What to Expect at [address] may have a sore throat for a day or two after the test.  How can you care for yourself at home? Activity  Rest as much as you need to after you go home. You should be able to go back to your usual activities the day after the test.  Diet  Drink plenty of fluids (unless your doctor has told you not to). Follow-up care is a key part of your treatment and safety. Be sure to make and go to all appointments, and call your doctor if you are having problems. When should you call for help? Call 911 anytime you think you may need emergency care. For example, call if:  You passed out (lost consciousness). You cough up blood. You vomit blood or what looks like coffee grounds. You pass maroon or very bloody stools. Call your doctor now or seek immediate medical care if:  You have trouble swallowing. You have belly pain. Your stools are black and tarlike or have streaks of blood. You are sick to your stomach or cannot keep fluids down. Watch closely for changes in your health, and be sure to contact your doctor if:  Your throat still hurts after a day or two. You do not get better as expected. Where can you learn more? Go to [email or web address] and sign in to your [email or web address] account. Enter W543 in the [facility] box to learn more about Upper GI Endoscopy: What to Expect at Home.     .     © 8423-9288 Healthwise, Incorporated. Care instructions adapted under license by [facility]. This care instruction is for use with your licensed healthcare professional. If you have questions about a medical condition or this instruction, always ask your healthcare professional. [place] [address] any warranty or liability for your use of this information.   Content Version: 8.4.116155; Last Revised: February 20, 2013
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
## 2022-10-20 NOTE — H&P
Interval H&P Note    Pt Name: Sinai Mackenzie  MRN: [id number]  YOB: 1960  Date of evaluation: 10/20/2022      [x] I have reviewed in [facility] the gastroenterology progress note by Dr. To Hutchins dated 9/22/2022 for an Interval History and Physical note. [x] I have examined  Oley Santa  There are no changes to the patient who is scheduled for EGD ESOPHAGOGASTRODUODENOSCOPY by Alesha Aldana MD for Gastroesophageal reflux disease, unspecified whether esophagitis present [K21.9]. The patient denies new health changes, fever, chills, wheezing, cough, increased SOB, chest pain, open sores or wounds. Denies hx diabetes and taking any blood thinning medications in the last 10 days. Vital signs: BP (!) 150/88   Pulse 72   Temp 97.3 °F (36.3 °C) (Temporal)   Resp 18   Ht 5' 3\" (1.6 m)   Wt 154 lb (69.9 kg)   SpO2 98%   BMI 27.28 kg/m²     Allergies:  Ambien [zolpidem], Ativan [lorazepam], Sulfa antibiotics, and Sulfa antibiotics    Medications:    Prior to Admission medications    Medication Sig Start Date End Date Taking? Authorizing Provider   GEMTESA 75 MG TABS TAKE ONE TABLET BY MOUTH DAILY 10/4/22   Wagner Mendez MD   metoprolol tartrate (LOPRESSOR) 25 MG tablet TAKE ONE TABLET BY MOUTH TWICE A DAY 9/11/22   Wagner Mendez MD   buPROPion (WELLBUTRIN XL) 150 MG extended release tablet TAKE ONE TABLET BY MOUTH EVERY MORNING 9/6/22   Wagner Mendez MD   ondansetron (ZOFRAN ODT) 4 MG disintegrating tablet Take 1 tablet by mouth every 8 hours as needed for Nausea or Vomiting 8/26/22   Wagner Mendez MD   traMADol (ULTRAM) 50 MG tablet Take 50 mg by mouth every 6 hours as needed for Pain.     Historical Provider, MD   metoprolol tartrate (LOPRESSOR) 25 MG tablet Take 1 tablet by mouth 2 times daily 8/22/22   Wagner Mendez MD   denosumab (PROLIA) 60 MG/ML SOSY SC injection  11/3/21   Historical Provider, MD   rosuvastatin (CRESTOR) 5 MG tablet TAKE 1 TABLET NIGHTLY 2/21/22 Joey Shah MD   ibuprofen (ADVIL;MOTRIN) 800 MG tablet Take 0.5 tablets by mouth 2 times daily as needed for Pain 9/14/21 10/20/22  Albert Chisholm DO   latanoprost (XALATAN) 0.005 % ophthalmic solution Place 1 drop into the left eye nightly    Historical Provider, MD   brimonidine-timolol (COMBIGAN) 0.2-0.5 % ophthalmic solution Place 1 drop into the left eye every 12 hours    Historical Provider, MD   timolol (TIMOPTIC-XE) 0.25 % ophthalmic gel-forming Place 1 drop into the left eye daily    Historical Provider, MD   prednisoLONE sodium phosphate (INFLAMASE FORTE) 1 % ophthalmic solution Place 1 drop into the right eye Daily    Historical Provider, MD   esomeprazole Magnesium (NEXIUM) 40 MG PACK Take 40 mg by mouth 2 times daily    Historical Provider, MD   calcium carbonate-vitamin D3 (CALTRATE) 600-400 MG-UNIT TABS per tab Take 600 mg by mouth 2 times daily    Historical Provider, MD   Biotin 69973 MCG TABS Take 1 tablet by mouth daily    Historical Provider, MD   Wheat Dextrin-Calcium (BENEFIBER PLUS CALCIUM) POWD Take 1 Dose by mouth daily    Historical Provider, MD   diclofenac sodium (VOLTAREN) 1 % GEL Apply topically as needed 6/8/20   Historical Provider, MD   tiZANidine (ZANAFLEX) 4 MG tablet Take 4 mg by mouth every 8 hours as needed 12/15/20   Historical Provider, MD   brimonidine (ALPHAGAN) 0.2 % ophthalmic solution Place 1 drop into the left eye 2 times daily 4/14/21   Historical Provider, MD   esomeprazole (NEXIUM) 40 MG delayed release capsule Take 40 mg by mouth 2 times daily 3/29/21   Historical Provider, MD   SIMPONI 50 MG/0.5ML SOAJ Inject 50 mg into the muscle every 30 days 4/24/21   Historical Provider, MD   latanoprost (XALATAN) 0.005 % ophthalmic solution Place 1 drop into the left eye every evening 4/14/21   Historical Provider, MD   leucovorin calcium (WELLCOVORIN) 5 MG tablet 5 mg once a week 4/24/21   Historical Provider, MD   methotrexate Sodium (RHEUMATREX) 50 MG/2ML chemo injection 0.88 mg once a week 4/24/21   Historical Provider, MD   prednisoLONE acetate (PRED FORTE) 1 % ophthalmic suspension Place 1 drop into the right eye every evening 4/14/21   Historical Provider, MD   timolol (TIMOPTIC) 0.5 % ophthalmic solution Place 1 drop into the left eye every morning 4/14/21   Historical Provider, MD         This is a 57 y.o. female who is pleasant, cooperative, alert and oriented x3, in no acute distress. Heart: Heart sounds are normal.  HR 72 regular rate and rhythm without murmur, gallop or rub. Lungs: Normal respiratory effort with equal expansion, good air exchange, unlabored and clear to auscultation without wheezes or rales bilaterally   Abdomen: soft, nontender, nondistended with bowel sounds active. Labs:  No results for input(s): HGB, HCT, WBC, MCV, PLT, NA, K, CL, CO2, BUN, CREATININE, GLUCOSE, INR, PROTIME, APTT, AST, ALT, LABALBU, HCG in the last 720 hours. No results for input(s): COVID19 in the last 720 hours. Mihai Hernandez, MICAH - CNP  Electronically signed 10/20/2022 at 7:23 AM       Poonam Jorgensen MD   Physician   Specialty:  Gastroenterology   Progress Notes      Signed   Encounter Date:  9/22/2022          Related encounter: Office Visit from 9/22/2022 in [address] [facility]           Reason for Referral:   No referring provider defined for this encounter. No chief complaint on file. No diagnosis found. HISTORY OF PRESENT ILLNESS:   Patient seen with the symptoms of chronic GERD. Patient states that she has GERD symptoms for more than 5 years. Has been taking Nexium 40 mg twice a day and still was getting breakthrough symptoms. However no dysphagia. No symptoms suggestive of extra esophageal manifestation of GERD. She has gained some weight recently. Mild dyspeptic symptoms. Also has history of abdominal bloating.   Has satisfactory bowel movements without melena, hematochezia, constipation etc.  Good appetite. Patient gives history of colonoscopy 2 years ago and was told that she interval colonoscopy was 10 years later. Her last EGD was more than 5 years ago and at that time she was told that she may have hiatal hernia. Patient has rheumatoid arthritis and has been on methotrexate. Apparently she is having liver test done every 2 months and I do not have access for the results. Patient is advised to get a copy for her files. Past Medical,Family, and Social History reviewed and does contribute to the patient presentingcondition. Patient's PMH/PSH,SH,PSYCH Hx, MEDs, ALLERGIES, and ROS were all reviewed and updated in the appropriate sections.      PAST MEDICAL HISTORY:  Past Medical History        Past Medical History:   Diagnosis Date    ADHD      Anxiety      Colon polyps      Depression      Diverticulitis      Gastroparesis      Glaucoma      Hiatal hernia      Hyperlipidemia      Hypertension      NUZHAT (obstructive sleep apnea)      Osteoporosis      RA (rheumatoid arthritis) ([facility] [address]              Past Surgical History         Past Surgical History:   Procedure Laterality Date    CATARACT REMOVAL Right      FOOT FUSION Left      FOOT SURGERY Right      GLAUCOMA SURGERY        HAND SURGERY Right      JOINT REPLACEMENT Bilateral      TUBAL LIGATION                CURRENT MEDICATIONS:    Current Medication      Current Outpatient Medications:     metoprolol tartrate (LOPRESSOR) 25 MG tablet, TAKE ONE TABLET BY MOUTH TWICE A DAY, Disp: 60 tablet, Rfl: 2    buPROPion (WELLBUTRIN XL) 150 MG extended release tablet, TAKE ONE TABLET BY MOUTH EVERY MORNING, Disp: 30 tablet, Rfl: 2    ondansetron (ZOFRAN ODT) 4 MG disintegrating tablet, Take 1 tablet by mouth every 8 hours as needed for Nausea or Vomiting, Disp: 20 tablet, Rfl: 0    traMADol (ULTRAM) 50 MG tablet, Take 50 mg by mouth every 6 hours as needed for Pain., Disp: , Rfl: metoprolol tartrate (LOPRESSOR) 25 MG tablet, Take 1 tablet by mouth 2 times daily, Disp: 180 tablet, Rfl: 1    Vibegron (GEMTESA) 75 MG TABS, Take 75 mg by mouth daily, Disp: 30 tablet, Rfl: 2    denosumab (PROLIA) 60 MG/ML SOSY SC injection, , Disp: , Rfl:     rosuvastatin (CRESTOR) 5 MG tablet, TAKE 1 TABLET NIGHTLY, Disp: 90 tablet, Rfl: 3    ibuprofen (ADVIL;MOTRIN) 800 MG tablet, Take 0.5 tablets by mouth 2 times daily as needed for Pain, Disp: 28 tablet, Rfl: 1    latanoprost (XALATAN) 0.005 % ophthalmic solution, Place 1 drop into the left eye nightly, Disp: , Rfl:     brimonidine-timolol (COMBIGAN) 0.2-0.5 % ophthalmic solution, Place 1 drop into the left eye every 12 hours, Disp: , Rfl:     timolol (TIMOPTIC-XE) 0.25 % ophthalmic gel-forming, Place 1 drop into the left eye daily, Disp: , Rfl:     prednisoLONE sodium phosphate (INFLAMASE FORTE) 1 % ophthalmic solution, Place 1 drop into the right eye Daily, Disp: , Rfl:     esomeprazole Magnesium (NEXIUM) 40 MG PACK, Take 40 mg by mouth 2 times daily, Disp: , Rfl:     metoprolol tartrate (LOPRESSOR) 25 MG tablet, Take 25 mg by mouth 2 times daily, Disp: , Rfl:     rosuvastatin (CRESTOR) 5 MG tablet, Take 5 mg by mouth daily, Disp: , Rfl:     calcium carbonate-vitamin D3 (CALTRATE) 600-400 MG-UNIT TABS per tab, Take 600 mg by mouth 2 times daily, Disp: , Rfl:     Biotin 09921 MCG TABS, Take 1 tablet by mouth daily, Disp: , Rfl:     Wheat Dextrin-Calcium (BENEFIBER PLUS CALCIUM) POWD, Take 1 Dose by mouth daily, Disp: , Rfl:     diclofenac sodium (VOLTAREN) 1 % GEL, Apply topically as needed, Disp: , Rfl:     tiZANidine (ZANAFLEX) 4 MG tablet, Take 4 mg by mouth every 8 hours as needed, Disp: , Rfl:     brimonidine (ALPHAGAN) 0.2 % ophthalmic solution, Place 1 drop into the left eye 2 times daily, Disp: , Rfl:     esomeprazole (NEXIUM) 40 MG delayed release capsule, Take 40 mg by mouth 2 times daily, Disp: , Rfl:     SIMPONI 50 MG/0.5ML SOAJ, Inject 50 mg into the muscle every 30 days, Disp: , Rfl:     latanoprost (XALATAN) 0.005 % ophthalmic solution, Place 1 drop into the left eye every evening, Disp: , Rfl:     leucovorin calcium (WELLCOVORIN) 5 MG tablet, 5 mg once a week, Disp: , Rfl:     methotrexate Sodium (RHEUMATREX) 50 MG/2ML chemo injection, 0.88 mg once a week, Disp: , Rfl:     prednisoLONE acetate (PRED FORTE) 1 % ophthalmic suspension, Place 1 drop into the right eye every evening, Disp: , Rfl:     timolol (TIMOPTIC) 0.5 % ophthalmic solution, Place 1 drop into the left eye every morning, Disp: , Rfl:         ALLERGIES:        Allergies   Allergen Reactions    Ambien [Zolpidem]      Ativan [Lorazepam]      Sulfa Antibiotics      Sulfa Antibiotics Rash         FAMILY HISTORY:   Family History             Problem Relation Age of Onset    Arthritis Mother      Uterine Cancer Mother      Atrial Fibrillation Mother      COPD Father      Diabetes Father      Hypertension Father      Prostate Cancer Father                 SOCIAL HISTORY:   Social History               Socioeconomic History    Marital status:        Spouse name: Not on file    Number of children: Not on file    Years of education: Not on file    Highest education level: Not on file   Occupational History    Not on file   Tobacco Use    Smoking status: Never    Smokeless tobacco: Never   Substance and Sexual Activity    Alcohol use: Yes       Comment: socially    Drug use: Not on file    Sexual activity: Not on file   Other Topics Concern    Not on file   Social History Narrative     ** Merged History Encounter **            Social Determinants of Health          Financial Resource Strain: Low Risk     Difficulty of Paying Living Expenses: Not hard at all   Food Insecurity: No Food Insecurity    Worried About Running Out of Food in the Last Year: Never true    Ran Out of Food in the Last Year: Never true   Transportation Needs: Not on file   Physical Activity: Not on file   Stress: Not on file   Social Connections: Not on file   Intimate Partner Violence: Not on file   Housing Stability: Not on file            REVIEW OF SYSTEMS:        Review of Systems   Constitutional: Negative. HENT: Negative. Eyes:  Positive for visual disturbance (glasses). Respiratory: Negative. Cardiovascular: Negative. Gastrointestinal:  Positive for abdominal distention. Endocrine: Negative. Genitourinary: Negative. Musculoskeletal: Negative. Skin: Negative. Allergic/Immunologic: Negative. Neurological: Negative. Hematological: Negative. Psychiatric/Behavioral: Negative. PHYSICAL EXAMINATION: Vital signs reviewed per the nursing documentation. There were no vitals taken for this visit. There is no height or weight on file to calculate BMI. Physical Exam  Vitals and nursing note reviewed. Constitutional:       Appearance: She is well-developed. HENT:      Head: Normocephalic and atraumatic. Eyes:      General: No scleral icterus. Conjunctiva/sclera: Conjunctivae normal.      Pupils: Pupils are equal, round, and reactive to light. Neck:      Thyroid: No thyromegaly. Vascular: No hepatojugular reflux or JVD. Trachea: No tracheal deviation. Cardiovascular:      Rate and Rhythm: Normal rate and regular rhythm. Heart sounds: Normal heart sounds. Pulmonary:      Effort: Pulmonary effort is normal. No respiratory distress. Breath sounds: Normal breath sounds. No wheezing or rales. Abdominal:      General: Bowel sounds are normal. There is no distension. Palpations: Abdomen is soft. There is no hepatomegaly or mass. Tenderness: There is no abdominal tenderness. There is no rebound. Hernia: No hernia is present. Comments: Mildly obese abdomen. Musculoskeletal:         General: No tenderness. Cervical back: Normal range of motion and neck supple.       Comments: No joint swelling   Lymphadenopathy:      Cervical: No cervical adenopathy. Skin:     General: Skin is warm. Findings: No bruising, ecchymosis, erythema or rash. Neurological:      Mental Status: She is alert and oriented to person, place, and time. Cranial Nerves: No cranial nerve deficit. Psychiatric:         Thought Content: Thought content normal.            LABORATORY DATA: Reviewed        Lab Results   Component Value Date     WBC 6.4 09/09/2021     HGB 11.8 (L) 09/09/2021     HCT 36.4 09/09/2021     MCV 84.7 09/09/2021      09/09/2021      (L) 09/09/2021     K 3.9 09/09/2021      09/09/2021     CO2 21 09/09/2021     BUN 15 09/09/2021     CREATININE 0.74 09/09/2021     LABALBU 4.4 05/11/2021     BILITOT 0.3 05/11/2021     ALKPHOS 55 05/11/2021     AST 43 05/11/2021     ALT 49 (H) 05/11/2021     INR 0.9 09/08/2021                  Lab Results   Component Value Date     RBC 4.30 09/09/2021     HGB 11.8 (L) 09/09/2021     MCV 84.7 09/09/2021     MCH 27.4 09/09/2021     MCHC 32.4 09/09/2021     RDW 14.0 09/09/2021     MPV 9.2 09/09/2021     BASOPCT 0.8 05/11/2021     LYMPHSABS 1.61 05/11/2021     MONOSABS 0.66 05/11/2021     NEUTROABS 2.40 05/11/2021     EOSABS 0.20 05/11/2021     BASOSABS 0.04 05/11/2021            DIAGNOSTIC TESTING:      No results found. IMPRESSION: Ms. Luis Manuel Rodriguez is a 57 y.o. female with      Assessment:  No diagnosis found. Plan:     Patient has chronic GERD and has been taking Nexium twice a day with breakthrough symptoms. Not clear whether really she has esophagitis or has Mon's etc.  She needs investigation including EGD. Discussed with the patient regarding the procedure, risks and benefits. After discussion patient understood and verbalized agreement. Meanwhile advised to follow antireflux measures. To lose some weight. Spent 30 minutes providing patient education and counseling. Thank you for allowing me to participate in the care of Ms. Baljitphilly Rodriguez.  For any further questions

## 2022-10-20 NOTE — OP NOTE
ESOPHAGOGASTRODUODENOSCOPY   ( EGD )  DATE OF PROCEDURE: 10/20/2022     SURGEON: Alesha Aldana MD    ASSISTANT: None    PREOPERATIVE DIAGNOSIS: Patient had persistent chronic heartburns. Procedure performed to evaluate upper GI lesions      POSTOPERATIVE DIAGNOSIS: Patient has 5 to 6 cm long hiatal hernia. Small duodenal polyp, multiple gastric polyps    OPERATION: Upper GI endoscopy with Biopsy    ANESTHESIA: MAC    ESTIMATED BLOOD LOSS: None    COMPLICATIONS: None. SPECIMENS:  Was Obtained: Duodenal polyp, gastric polyp, biopsies from the esophagus to check for microscopic esophagitis    HISTORY: The patient is a 57y.o. year old female with history of above preop diagnosis. I recommended esophagogastroduodenoscopy with possible biopsy and I explained the risk, benefits, expected outcome, and alternatives to the procedure. Risks included but are not limited to bleeding, infection, respiratory distress, hypotension, and perforation of the esophagus, stomach, or duodenum. Patient understands and is in agreement. PROCEDURE: The patient was given IV conscious sedation. The patient's SPO2 remained above 90% throughout the procedure. Cetacaine spray given. Patient placed in left lateral position. Olympus  videogastroscope was inserted orally under vision into the esophagus without difficulty and advanced into the stomach then through the pylorus up to the second part of duodenum. Findings:    Retropharyngeal area was grossly normal appearing    Esophagus: normal.  However appears to have dysmotility. Squamocolumnar junction is at about 30 cm. No esophageal stricture or Mon's mucosa seen. Multiple random biopsies taken from the esophagus to evaluate microscopic esophagitis    Patient has 5 to 6 cm long hiatal hernia. Stomach:    Fundus and Cardia Examined in Retroflexed View: normal    Body: abnormal: Multiple 3 to 5 mm benign looking gastric polyps seen.   Couple of polyps were biopsied for histology. Antrum: normal    Duodenum:     Descending: normal    Bulb: abnormal: 3 to 4 mm polyp in the duodenal bulb, excised with biopsy forceps for histology rule out carcinoid polyp    While withdrawing the scope the above findings were verified and the scope was removed. The patient has tolerated the procedure without unusual events.          Recommendations/Plan:   F/U Biopsies  F/U In Office as instructed  Discussed with the family                   Electronically signed by Tsering Gonzalez MD  on 10/20/2022 at 8:48 AM

## 2022-10-20 NOTE — ANESTHESIA POSTPROCEDURE EVALUATION
Department of Anesthesiology  Postprocedure Note    Patient: Viki Armas  MRN: [id number]  [place]: 1960  Date of evaluation: 10/20/2022      Procedure Summary     Date: 10/20/22 Room / Location: [place] [address] / [facility] - INPATIENT    Anesthesia Start: 2710 Anesthesia Stop: 2425    Procedure: EGD BIOPSY (Mouth) Diagnosis:       Gastroesophageal reflux disease, unspecified whether esophagitis present      (Gastroesophageal reflux disease, unspecified whether esophagitis present [K21.9])    Surgeons: Mark Badillo MD Responsible Provider: Kayleigh Lee MD    Anesthesia Type: general ASA Status: 3          Anesthesia Type: No value filed.     Taylor Phase I:      Taylor Phase II: Taylor Score: 8      Anesthesia Post Evaluation    Complications: no

## 2022-10-21 LAB — SURGICAL PATHOLOGY REPORT: NORMAL

## 2022-10-27 ENCOUNTER — OFFICE VISIT (OUTPATIENT)
Dept: GASTROENTEROLOGY | Age: 62
End: 2022-10-27
Payer: COMMERCIAL

## 2022-10-27 VITALS
SYSTOLIC BLOOD PRESSURE: 146 MMHG | WEIGHT: 157 LBS | BODY MASS INDEX: 27.82 KG/M2 | DIASTOLIC BLOOD PRESSURE: 86 MMHG | HEIGHT: 63 IN

## 2022-10-27 DIAGNOSIS — K44.9 HIATAL HERNIA: ICD-10-CM

## 2022-10-27 DIAGNOSIS — D13.30 TUBULAR ADENOMA OF SMALL INTESTINE: ICD-10-CM

## 2022-10-27 DIAGNOSIS — D13.1 FUNDIC GLAND POLYPS OF STOMACH, BENIGN: ICD-10-CM

## 2022-10-27 DIAGNOSIS — Z12.11 SCREEN FOR COLON CANCER: ICD-10-CM

## 2022-10-27 DIAGNOSIS — K21.9 CHRONIC GERD: Primary | ICD-10-CM

## 2022-10-27 PROCEDURE — 3017F COLORECTAL CA SCREEN DOC REV: CPT | Performed by: INTERNAL MEDICINE

## 2022-10-27 PROCEDURE — G8419 CALC BMI OUT NRM PARAM NOF/U: HCPCS | Performed by: INTERNAL MEDICINE

## 2022-10-27 PROCEDURE — 99214 OFFICE O/P EST MOD 30 MIN: CPT | Performed by: INTERNAL MEDICINE

## 2022-10-27 PROCEDURE — 1036F TOBACCO NON-USER: CPT | Performed by: INTERNAL MEDICINE

## 2022-10-27 PROCEDURE — APPSS45 APP SPLIT SHARED TIME 31-45 MINUTES: Performed by: NURSE PRACTITIONER

## 2022-10-27 PROCEDURE — G8427 DOCREV CUR MEDS BY ELIG CLIN: HCPCS | Performed by: INTERNAL MEDICINE

## 2022-10-27 PROCEDURE — G8484 FLU IMMUNIZE NO ADMIN: HCPCS | Performed by: INTERNAL MEDICINE

## 2022-10-27 RX ORDER — POLYETHYLENE GLYCOL 3350 17 G/17G
238 POWDER, FOR SOLUTION ORAL ONCE
Qty: 238 G | Refills: 0 | Status: SHIPPED | OUTPATIENT
Start: 2022-10-27 | End: 2022-10-27

## 2022-10-27 RX ORDER — BISACODYL 5 MG
20 TABLET, DELAYED RELEASE (ENTERIC COATED) ORAL ONCE
Qty: 4 TABLET | Refills: 0 | Status: SHIPPED | OUTPATIENT
Start: 2022-10-27 | End: 2022-10-27

## 2022-10-27 ASSESSMENT — ENCOUNTER SYMPTOMS
ALLERGIC/IMMUNOLOGIC NEGATIVE: 1
RESPIRATORY NEGATIVE: 1
ABDOMINAL DISTENTION: 1

## 2022-10-27 NOTE — PROGRESS NOTES
GI OFFICE FOLLOW UP    INTERVAL HISTORY:   No referring provider defined for this encounter. Chief Complaint   Patient presents with    Gastroesophageal Reflux     Pt here for egd f/u. Pt states having a lot of burping          HISTORY OF PRESENT ILLNESS:     Patient being seen for EGD follow-up to evaluate chronic GERD, dyspepsia. EGD revealed 5-6 cm HH  Esophageal biopsies unremarkable  Multiple 3-5 mm gastric polyps - bx revealed fundic gland polyps. Also had 3-4 mm duodenal polyp removed with biopsy forceps. This was a tubular adenoma. At present patient is doing well. Reports that with PPI therapy, dietary changes, and antireflux measures she has rare breakthrough symptoms. No dysphagia, odynophagia  Has good appetite  No weight loss    Bowel movements satisfactory. Denies any melena, hematochezia. Last colonoscopy 2016 Dr. Daniel Lira. Recommend colonoscopy 2021 per records. Past Medical,Family, and Social History reviewed and does contribute to the patient presenting condition. Patient's PMH/PSH,SH,PSYCH Hx, MEDs, ALLERGIES, and ROS were all reviewed and updated in the appropriate sections.  Yes      PAST MEDICAL HISTORY:  Past Medical History:   Diagnosis Date    ADHD     Anxiety     Colon polyps     Depression     Diverticulitis     Gastroparesis     Glaucoma     Hiatal hernia     Hyperlipidemia     Hypertension     NUZHAT (obstructive sleep apnea)     Osteoporosis     RA (rheumatoid arthritis) ([facility] [address]        Past Surgical History:   Procedure Laterality Date    BACK INJECTION Right 10/2022    CATARACT REMOVAL Right     COLONOSCOPY      ENDOMETRIAL ABLATION      ENDOSCOPY, COLON, DIAGNOSTIC      FOOT FUSION Left     FOOT SURGERY Right     GLAUCOMA SURGERY      HAND SURGERY Right     JOINT REPLACEMENT Bilateral     Bilateral hips    KNEE ARTHROSCOPY Right 2020    TUBAL LIGATION UPPER GASTROINTESTINAL ENDOSCOPY N/A 10/20/2022    EGD BIOPSY performed by Jordan Sequeira MD at [facility]:    Current Outpatient Medications:     GEMTESA 75 MG TABS, TAKE ONE TABLET BY MOUTH DAILY, Disp: 30 tablet, Rfl: 2    metoprolol tartrate (LOPRESSOR) 25 MG tablet, TAKE ONE TABLET BY MOUTH TWICE A DAY, Disp: 60 tablet, Rfl: 2    buPROPion (WELLBUTRIN XL) 150 MG extended release tablet, TAKE ONE TABLET BY MOUTH EVERY MORNING, Disp: 30 tablet, Rfl: 2    ondansetron (ZOFRAN ODT) 4 MG disintegrating tablet, Take 1 tablet by mouth every 8 hours as needed for Nausea or Vomiting, Disp: 20 tablet, Rfl: 0    traMADol (ULTRAM) 50 MG tablet, Take 50 mg by mouth every 6 hours as needed for Pain., Disp: , Rfl:     metoprolol tartrate (LOPRESSOR) 25 MG tablet, Take 1 tablet by mouth 2 times daily, Disp: 180 tablet, Rfl: 1    denosumab (PROLIA) 60 MG/ML SOSY SC injection, Inject 60 mg into the skin TWICE A YEAR, Disp: , Rfl:     rosuvastatin (CRESTOR) 5 MG tablet, TAKE 1 TABLET NIGHTLY, Disp: 90 tablet, Rfl: 3    latanoprost (XALATAN) 0.005 % ophthalmic solution, Place 1 drop into the left eye nightly, Disp: , Rfl:     brimonidine-timolol (COMBIGAN) 0.2-0.5 % ophthalmic solution, Place 1 drop into the left eye every 12 hours, Disp: , Rfl:     timolol (TIMOPTIC-XE) 0.25 % ophthalmic gel-forming, Place 1 drop into the left eye daily, Disp: , Rfl:     prednisoLONE sodium phosphate (INFLAMASE FORTE) 1 % ophthalmic solution, Place 1 drop into the right eye Daily, Disp: , Rfl:     esomeprazole Magnesium (NEXIUM) 40 MG PACK, Take 40 mg by mouth 2 times daily, Disp: , Rfl:     calcium carbonate-vitamin D3 (CALTRATE) 600-400 MG-UNIT TABS per tab, Take 600 mg by mouth 2 times daily, Disp: , Rfl:     Biotin 77892 MCG TABS, Take 1 tablet by mouth daily, Disp: , Rfl:     Wheat Dextrin-Calcium (BENEFIBER PLUS CALCIUM) POWD, Take 1 Dose by mouth in the morning and at bedtime, Disp: , Rfl:     diclofenac sodium (VOLTAREN) 1 % GEL, Apply topically as needed, Disp: , Rfl:     tiZANidine (ZANAFLEX) 4 MG tablet, Take 4 mg by mouth every 8 hours as needed, Disp: , Rfl:     brimonidine (ALPHAGAN) 0.2 % ophthalmic solution, Place 1 drop into the left eye 2 times daily, Disp: , Rfl:     esomeprazole (NEXIUM) 40 MG delayed release capsule, Take 40 mg by mouth 2 times daily, Disp: , Rfl:     SIMPONI 50 MG/0.5ML SOAJ, Inject 50 mg into the muscle every 30 days, Disp: , Rfl:     latanoprost (XALATAN) 0.005 % ophthalmic solution, Place 1 drop into the left eye every evening, Disp: , Rfl:     leucovorin calcium (WELLCOVORIN) 5 MG tablet, 5 mg once a week, Disp: , Rfl:     methotrexate Sodium (RHEUMATREX) 50 MG/2ML chemo injection, 0.88 mg once a week, Disp: , Rfl:     prednisoLONE acetate (PRED FORTE) 1 % ophthalmic suspension, Place 1 drop into the right eye every evening, Disp: , Rfl:     timolol (TIMOPTIC) 0.5 % ophthalmic solution, Place 1 drop into the left eye every morning, Disp: , Rfl:     ibuprofen (ADVIL;MOTRIN) 800 MG tablet, Take 0.5 tablets by mouth 2 times daily as needed for Pain, Disp: 28 tablet, Rfl: 1    ALLERGIES:   Allergies   Allergen Reactions    Ambien [Zolpidem] Other (See Comments)     Nightmares    Ativan [Lorazepam] Other (See Comments)     Agitation in ED    Sulfa Antibiotics     Sulfa Antibiotics Rash       FAMILY HISTORY:       Problem Relation Age of Onset    Arthritis Mother     Uterine Cancer Mother     Atrial Fibrillation Mother     COPD Father     Diabetes Father     Hypertension Father     Prostate Cancer Father          SOCIAL HISTORY:   Social History     Socioeconomic History    Marital status:      Spouse name: Not on file    Number of children: Not on file    Years of education: Not on file    Highest education level: Not on file   Occupational History    Not on file   Tobacco Use    Smoking status: Never    Smokeless tobacco: Never   Substance and Sexual Activity    Alcohol use: Yes     Comment: socially    Drug use: Never    Sexual activity: Not on file   Other Topics Concern    Not on file   Social History Narrative    ** Merged History Encounter **          Social Determinants of Health     Financial Resource Strain: Low Risk     Difficulty of Paying Living Expenses: Not hard at all   Food Insecurity: No Food Insecurity    Worried About Running Out of Food in the Last Year: Never true    Ran Out of Food in the Last Year: Never true   Transportation Needs: Not on file   Physical Activity: Not on file   Stress: Not on file   Social Connections: Not on file   Intimate Partner Violence: Not on file   Housing Stability: Not on file         REVIEW OF SYSTEMS:         Review of Systems   Constitutional: Negative. HENT: Negative. Eyes:  Positive for visual disturbance (glasses). Respiratory: Negative. Cardiovascular: Negative. Gastrointestinal:  Positive for abdominal distention. Endocrine: Negative. Genitourinary: Negative. Musculoskeletal: Negative. Skin: Negative. Allergic/Immunologic: Negative. Neurological: Negative. Hematological: Negative. Psychiatric/Behavioral: Negative. PHYSICAL EXAMINATION:     Vital signs reviewed per the nursing documentation. BP (!) 146/86   Ht 5' 3\" (1.6 m)   Wt 157 lb (71.2 kg)   BMI 27.81 kg/m²   Body mass index is 27.81 kg/m². Physical Exam  Constitutional:       Appearance: Normal appearance. Eyes:      General: No scleral icterus. Pupils: Pupils are equal, round, and reactive to light. Cardiovascular:      Rate and Rhythm: Normal rate and regular rhythm. Heart sounds: Normal heart sounds. Pulmonary:      Effort: Pulmonary effort is normal.      Breath sounds: Normal breath sounds. Abdominal:      General: Bowel sounds are normal. There is no distension. Palpations: Abdomen is soft. There is no mass. Tenderness: There is no abdominal tenderness. There is no guarding.    Skin: General: Skin is warm and dry. Coloration: Skin is not jaundiced. Neurological:      Mental Status: She is alert and oriented to person, place, and time. Mental status is at baseline. LABORATORY DATA: Reviewed  Lab Results   Component Value Date    WBC 6.4 09/09/2021    HGB 11.8 (L) 09/09/2021    HCT 36.4 09/09/2021    MCV 84.7 09/09/2021     09/09/2021     (L) 09/09/2021    K 3.9 09/09/2021     09/09/2021    CO2 21 09/09/2021    BUN 15 09/09/2021    CREATININE 0.74 09/09/2021    LABALBU 4.4 05/11/2021    BILITOT 0.3 05/11/2021    ALKPHOS 55 05/11/2021    AST 43 05/11/2021    ALT 49 (H) 05/11/2021    INR 0.9 09/08/2021         Lab Results   Component Value Date    RBC 4.30 09/09/2021    HGB 11.8 (L) 09/09/2021    MCV 84.7 09/09/2021    MCH 27.4 09/09/2021    MCHC 32.4 09/09/2021    RDW 14.0 09/09/2021    MPV 9.2 09/09/2021    BASOPCT 0.8 05/11/2021    LYMPHSABS 1.61 05/11/2021    MONOSABS 0.66 05/11/2021    NEUTROABS 2.40 05/11/2021    EOSABS 0.20 05/11/2021    BASOSABS 0.04 05/11/2021         DIAGNOSTIC TESTING:     No results found. Assessment  1. Chronic GERD    2. Hiatal hernia    3. Tubular adenoma of small intestine    4. Fundic gland polyps of stomach, benign    5. Screen for colon cancer        Plan    EGD reviewed with patient in detail. Noted to have tubular adenoma in the duodenum removed with biopsy forceps. No know hx of familial polyposis. Will arrange repeat EGD in 6 months to evaluate polypectomy site. Also at that time we will plan for screening colonoscopy. To continue PPI therapy and antireflux measures.      The Endoscopic procedure was explained to the patient in detail  The prep and NPO were explained  All the Risks, Benefits, and Alternatives were explained  Risk of Bleeding, Perforation and Cardio Respiratory risks were explained  her questions were answered  The procedure has been scheduled with the  in the office  Patient was asked to give us a call for any questions  The patient has verbalized understanding and agreement to this plan. The patient was counseled at length about the risks of melissa Covid-19 during their perioperative period and any recovery window from their procedure. The patient was made aware that melissa Covid-19  may worsen their prognosis for recovering from their procedure  and lend to a higher morbidity and/or mortality risk. All material risks, benefits, and reasonable alternatives including postponing the procedure were discussed. The patient does wish to proceed with the procedure at this time. GI attending physician note. Patient seen with APRN     I independently performed an evaluation on John Pickering. I have reviewed the above documentation completed by the Nurse Practitioner and agree with the history, examination, and management plan. Recommendations discussed. Discussed with APRN regarding this patient. At present patient is stable. Advised antireflux measures. May need EGD in 6 months to evaluate duodenal bulb for residual polyp/adenoma. Thank you for allowing me to participate in the care of Ms. Katerin Alegria. For any further questions please do not hesitate to contact me. I have reviewed and agree with the ROS entered by the MA/LPN. Note is dictated utilizing voice recognition software. Unfortunately this leads to occasional typographical errors.  Please contact our office if you have any questions        Franco Costello MD,FACP, [facility]  Board Certified in Gastroenterology and [address] [place] [address] Gastroenterology  Office #: [phone]

## 2022-11-16 ENCOUNTER — TELEPHONE (OUTPATIENT)
Dept: ORTHOPEDIC SURGERY | Age: 62
End: 2022-11-16

## 2023-02-13 ENCOUNTER — TELEPHONE (OUTPATIENT)
Dept: GASTROENTEROLOGY | Age: 63
End: 2023-02-13

## (undated) DEVICE — Device: Brand: DEFENDO VALVE AND CONNECTOR KIT

## (undated) DEVICE — BLOCK BITE 60FR RUBBER ADLT DENTAL

## (undated) DEVICE — MEDICINE CUP, GRADUATED, STER: Brand: MEDLINE

## (undated) DEVICE — FORCEP BX MESH TOOTH MIC 2.8 MMX240 CM NDL STRL RADIAL JAW 4

## (undated) DEVICE — GLOVE SURG 7 11.7IN BEAD CUF LIGHT BRN SENSICARE LTX FREE

## (undated) DEVICE — GAUZE,SPONGE,4"X4",16PLY,STRL,LF,10/TRAY: Brand: MEDLINE

## (undated) DEVICE — CO2 CANNULA,SUPERSOFT, ADLT,7'O2,7'CO2: Brand: MEDLINE

## (undated) DEVICE — BASIN EMSIS 700ML GRAPHITE PLAS KID SHP GRAD

## (undated) DEVICE — JELLY,LUBE,STERILE,FLIP TOP,TUBE,2-OZ: Brand: MEDLINE

## (undated) DEVICE — ADAPTER TBNG LUER STUB 15 GA INTMED